# Patient Record
Sex: FEMALE | Race: WHITE | Employment: OTHER | URBAN - METROPOLITAN AREA
[De-identification: names, ages, dates, MRNs, and addresses within clinical notes are randomized per-mention and may not be internally consistent; named-entity substitution may affect disease eponyms.]

---

## 2017-02-06 ENCOUNTER — OFFICE VISIT (OUTPATIENT)
Dept: INTERNAL MEDICINE CLINIC | Age: 82
End: 2017-02-06

## 2017-02-06 ENCOUNTER — HOSPITAL ENCOUNTER (OUTPATIENT)
Dept: LAB | Age: 82
Discharge: HOME | End: 2017-02-06
Payer: MEDICARE

## 2017-02-06 VITALS
WEIGHT: 97 LBS | BODY MASS INDEX: 22.45 KG/M2 | DIASTOLIC BLOOD PRESSURE: 60 MMHG | OXYGEN SATURATION: 99 % | RESPIRATION RATE: 16 BRPM | HEIGHT: 55 IN | SYSTOLIC BLOOD PRESSURE: 140 MMHG | HEART RATE: 74 BPM

## 2017-02-06 DIAGNOSIS — M81.0 SENILE OSTEOPOROSIS: ICD-10-CM

## 2017-02-06 DIAGNOSIS — E78.5 DYSLIPIDEMIA, GOAL LDL BELOW 100: ICD-10-CM

## 2017-02-06 DIAGNOSIS — I10 ESSENTIAL HYPERTENSION: ICD-10-CM

## 2017-02-06 DIAGNOSIS — I10 HTN (HYPERTENSION), BENIGN: Primary | ICD-10-CM

## 2017-02-06 PROCEDURE — 80053 COMPREHEN METABOLIC PANEL: CPT

## 2017-02-06 PROCEDURE — 84443 ASSAY THYROID STIM HORMONE: CPT

## 2017-02-06 PROCEDURE — 80061 LIPID PANEL: CPT

## 2017-02-06 PROCEDURE — 82306 VITAMIN D 25 HYDROXY: CPT

## 2017-02-06 PROCEDURE — 85025 COMPLETE CBC W/AUTO DIFF WBC: CPT

## 2017-02-06 PROCEDURE — 36415 COLL VENOUS BLD VENIPUNCTURE: CPT

## 2017-02-06 NOTE — PROGRESS NOTES
SUBJECTIVE: Jodi Saravia is a 80 y.o. female seen for a follow up visit; she has hypertension, hyperlipidemia and no known cardiovascular conditions. Current Outpatient Prescriptions   Medication Sig Dispense Refill    amLODIPine (NORVASC) 5 mg tablet TAKE ONE (1) TABLET(S) DAILY 90 Tab 1    losartan-hydroCHLOROthiazide (HYZAAR) 100-25 mg per tablet TAKE ONE TABLET BY MOUTH ONCE DAILY 90 Tab 1    triamcinolone acetonide (KENALOG) 0.1 % ointment Apply  to affected area two (2) times a day. use thin layer 80 g 2    simvastatin (ZOCOR) 10 mg tablet TAKE ONE (1) TABLET(S) EVERY EVENING 90 Tab 3    ranitidine (ZANTAC) 150 mg tablet Take 1 Tab by mouth two (2) times a day.  BISMUTH SUBSALICYLATE (PEPTO-BISMOL PO) Take  by mouth as needed.  PREVNAR 13, PF, 0.5 mL syrg injection   0    multivitamin (ONE A DAY) tablet Take 1 Tab by mouth daily. Patient Active Problem List   Diagnosis Code    Dyslipidemia, goal LDL below 100 E78.5    Cognitive decline R41.89    Eczema L30.9    Senile osteoporosis M81.0    Encounter for long-term (current) use of other medications Z79.899    HTN (hypertension), benign I10     System Review: Cardiovascular ROS - taking medications as instructed, no medication side effects noted, home BP monitoring in range of 620'K systolic over 49'U diastolic, no TIA's, no chest pain on exertion, no dyspnea on exertion, slight swelling of ankles, CNS ROS - no TIA or stroke-like symptoms, no amaurosis, diplopia, abnormal speech, unilateral numbness or weakness. New concerns: edema a little  Better in morning  No falls or pain  Off biphosphonates  Home BP   stable. OBJECTIVE:  Visit Vitals    /60    Pulse 74    Resp 16    Ht 4' 6\" (1.372 m)    Wt 97 lb (44 kg)    LMP 02/05/1974    SpO2 99%    BMI 23.39 kg/m2      Appearance: alert, well appearing, and in no distress and normal appearing weight.   General exam: CVS exam BP noted to be well controlled today in office, S1, S2 normal, no gallop, no murmur, chest clear, no JVD, no HSM, slight edema. Lab review: orders written for new lab studies as appropriate; see orders, no lab studies available for review at time of visit. Lab Results   Component Value Date/Time    Cholesterol, total 189 02/03/2016 12:00 AM    HDL Cholesterol 71 02/03/2016 12:00 AM    LDL, calculated 64 02/03/2016 12:00 AM    VLDL, calculated 54 02/03/2016 12:00 AM    Triglyceride 269 02/03/2016 12:00 AM       ASSESSMENT:  hypertension stable, hyperlipidemia stable. Edema ask her to see  How her ankles are  After sleeping  PLAN:  orders and follow up as documented in patient record  reviewed diet, exercise and weight control  recommended sodium restriction  cardiovascular risk and specific lipid/LDL goals reviewed. Richie Walters was seen today for follow-up. Diagnoses and all orders for this visit:    HTN (hypertension), benign    Senile osteoporosis  -     VITAMIN D, 25 HYDROXY; Future    Essential hypertension  -     CBC WITH AUTOMATED DIFF  -     LIPID PANEL  -     METABOLIC PANEL, COMPREHENSIVE  -     TSH 3RD GENERATION    Dyslipidemia, goal LDL below 100  -     LIPID PANEL                      Preventing falls (The Basics)Written by the doctors and editors at UpToDate       Am I at risk of falling?  Your risk of falling increases as you grow older. Thats because getting older can make it harder to walk steadily and keep your balance. Also, the effects of falls are more serious in older people. Overall, 3 to 4 out of every 10 people over the age of 72 fall each year. Up to 76 percent of people who fracture a hip never recover to the point they were before they had their fracture. If you have fallen in the past, you are at higher risk of falling again.     Several things can increase your risk of a fall, including:  Illness   A change in the medicines you take   An unsafe or unfamiliar setting (for example, a room with rugs or furniture that might trip you, or an area you dont know well)    How can my doctor help me to avoid falling?  Your doctor can talk to you about the following things:  Past falls  It is important to tell your doctor about any times you have fallen or almost fallen. He or she can then suggest ways to prevent another fall. Your health conditions  Some health problems can put you at risk of falling. These include conditions that affect eyesight, hearing, muscle strength, or balance. The medicines you take  Certain medicines can increase the risk of falling. These include some medicines that are used for sleeping problems, anxiety, or depression. Adding new medicines, or changing doses of some medicines, can also affect your risk of falling. The more your doctor knows about your situation, the better he or she will be able to help you. For example, if you fell because you have a condition that causes pain, your doctor might suggest treatments to deal with the pain. Or if 1 of your medicines is making you dizzy and more likely to fall, your doctor might switch you to a different medicine. Is there anything I can do on my own?  Yes. To help keep from falling, you can:  Make your home safer  To avoid falling at home, get rid of things that might make you trip or slip. This might include furniture, electrical cords, clutter, and loose rugs. Keep your home well lit so that you can easily see where you are going. Avoid storing things in high places so you dont have to reach or climb. Wear sturdy shoes that fit well  Wearing shoes with high heels or slippery soles, or shoes that are too loose can lead to falls. Walking around in bare feet, or only socks, can also increase your risk of falling. Take vitamin D pills  Taking vitamin D might lower the risk of falls in older people. This is because vitamin D helps make bones and muscles stronger.  Your doctor can help you decide how much vitamin D to take.   Stay active  Exercising on a regular basis can help lower your risk of falling. It is best to do a few different activities that help with both strength and balance. There are many kinds of exercise that can be safe for older people. These include walking, swimming, and Arslan Chi (a [employer] martial art that involves slow, gentle movements). Use a cane, walker, and other safety devices  If your doctor recommends that you use a cane or walker, be sure that its the right size and you know how to use it. There are other devices that might help you avoid falling, too. These include grab bars or a sturdy seat for the shower, and hand rails or treads for the stairs (to prevent slipping). If you worry that you could fall, there are also alarm buttons that let you call for help if you fall and cant get up. What should I do if I fall?  If you fall, see your doctor right away, even if you arent hurt. Your doctor can try to figure out what caused you to fall, and how likely you are to fall again. He or she will do an exam and talk to you about your health problems, medicines, and activities. Then he or she can suggest things you can do to avoid falling again. Many older people have a hard time recovering after a fall. Doing things to prevent falling can help you to protect your health and independence. Muscle Conditioning: Exercises  Your Care Instructions  Here are some examples of exercises for muscle conditioning. Start each exercise slowly. Ease off the exercise if you start to have pain. Your doctor or physical therapist will tell you when you can start these exercises and which ones will work best for you. How to do the exercises  Wall push-ups    1. Stand facing a wall, about 12 to 18 inches away. 2. Place your hands on the wall at shoulder height. 3. Slowly bend your elbows and bring your face toward the wall, moving your hips and shoulders forward together.   4. Push slowly back to the starting position. 5. Start with 5 repetitions and work up to 8 to 12. 6. Rest for a minute, and repeat the exercise. Note: When you can do this exercise against a wall comfortably (without your muscles feeling tired), you can try it against a counter. Start with 5 repetitions again and work up to 8 to 12. You can then slowly progress to the end of a couch or a sturdy chair, and finally to the floor. Knee extension    1. While sitting in a chair, straighten one leg and hold while you slowly count to 5. Be sure you do not lock your knee. 2. Repeat 8 to 12 times. 3. Rest for a minute, and repeat the exercise. 4. Do the same exercise with the other leg. Note: If this exercise becomes easy, you can add a light weight around your ankle or tie an elastic resistance band to a chair leg and one ankle. Side-lying leg lift    1. Lie on your side, with your legs extended. Keep your hips straight up and down during this exercise. Do not let your top hip rock toward the back. Support your head with your hand, and place the other hand on the floor near your waist.  2. Slowly raise your upper leg until it is about in line with your shoulder. Keep your toes pointed forward. 3. Slowly lower your leg to the starting position. 4. Repeat 8 to 12 times. 5. Rest for a minute, and repeat the exercise. 6. Turn to your other side and do the same exercise with your other leg. Note: If this exercise becomes easy, you can add a light weight around your ankle or tie an elastic resistance band to both ankles. Shallow standing knee bends    1. Stand with your hands lightly resting on a counter or chair in front of you with your feet shoulder-width apart. 2. Slowly bend your knees so that you squat down just like you were going to sit in a chair. Make sure your knees do not go in front of your toes. 3. Lower yourself about 6 inches. Your heels should remain on the floor at all times.   4. Rise slowly to a standing position. 5. Repeat 8 to 12 times. 6. Rest for a minute, and repeat the exercise. Follow-up care is a key part of your treatment and safety. Be sure to make and go to all appointments, and call your doctor if you are having problems. It's also a good idea to know your test results and keep a list of the medicines you take.

## 2017-02-06 NOTE — PROGRESS NOTES
Reviewed record in preparation for visit and have obtained necessary documentation. Lucila Rocha is a 80 y.o. female with the following history as recorded in [facility]:  Patient Active Problem List    Diagnosis Date Noted    HTN (hypertension), benign 08/03/2016    Encounter for long-term (current) use of other medications 10/16/2013    Senile osteoporosis 10/06/2013    Eczema 09/18/2013    Dyslipidemia, goal LDL below 100 06/19/2012    Cognitive decline 06/19/2012     Current Outpatient Prescriptions   Medication Sig Dispense Refill    amLODIPine (NORVASC) 5 mg tablet TAKE ONE (1) TABLET(S) DAILY 90 Tab 1    losartan-hydroCHLOROthiazide (HYZAAR) 100-25 mg per tablet TAKE ONE TABLET BY MOUTH ONCE DAILY 90 Tab 1    triamcinolone acetonide (KENALOG) 0.1 % ointment Apply  to affected area two (2) times a day. use thin layer 80 g 2    simvastatin (ZOCOR) 10 mg tablet TAKE ONE (1) TABLET(S) EVERY EVENING 90 Tab 3    ranitidine (ZANTAC) 150 mg tablet Take 1 Tab by mouth two (2) times a day.  BISMUTH SUBSALICYLATE (PEPTO-BISMOL PO) Take  by mouth as needed.  PREVNAR 13, PF, 0.5 mL syrg injection   0    multivitamin (ONE A DAY) tablet Take 1 Tab by mouth daily. Allergies: Fosamax [alendronate]  Past Medical History   Diagnosis Date    Cognitive decline 6/19/2012    Hypertension      No past surgical history on file. No family history on file.   Social History   Substance Use Topics    Smoking status: Never Smoker    Smokeless tobacco: Never Used    Alcohol use 1.0 oz/week     2 Glasses of wine per week     No turner has edema    Vitals:    02/06/17 1516   BP: 140/60   Pulse: 74   Resp: 16   SpO2: 99%   Weight: 97 lb (44 kg)   Height: 4' 6\" (1.372 m)

## 2017-02-06 NOTE — MR AVS SNAPSHOT
Visit Information Date & Time Provider Department Dept. Phone Encounter #  
 2/6/2017  3:30 PM Pooja Schumacher, [facility] [phone] [id number] Your Appointments 8/8/2017  2:00 PM  
ROUTINE CARE with Pooja Schumacher MD  
[facility]) [place] [address] [place] [address] [facility] [address] [place] [address] [address] [facility] [place] [facility] [address] Upcoming Health Maintenance Date Due ZOSTER VACCINE AGE 60> 3/15/1981 Pneumococcal 65+ Low/Medium Risk (2 of 2 - PCV13) 9/18/2014 GLAUCOMA SCREENING Q2Y 3/1/2016 MEDICARE YEARLY EXAM 8/4/2017 DTaP/Tdap/Td series (2 - Td) 6/7/2022 Allergies as of 2/6/2017  Review Complete On: 2/6/2017 By: Ángel Salomon LPN Severity Noted Reaction Type Reactions Fosamax [Alendronate]  02/05/2014   Side Effect Myalgia Current Immunizations  Reviewed on 10/4/2016 Name Date Influenza High Dose Vaccine PF 10/4/2016, 10/13/2015 Influenza Vaccine 9/18/2013 Pneumococcal Polysaccharide (PPSV-23) 9/18/2013 TDAP Vaccine 6/7/2012 Not reviewed this visit You Were Diagnosed With   
  
 Codes Comments HTN (hypertension), benign    -  Primary ICD-10-CM: I10 
ICD-9-CM: 401.1 Senile osteoporosis     ICD-10-CM: M81.0 ICD-9-CM: 733.01 Essential hypertension     ICD-10-CM: I10 
ICD-9-CM: 401.9 Dyslipidemia, goal LDL below 100     ICD-10-CM: E78.5 ICD-9-CM: 272.4 Vitals BP Pulse Resp Height(growth percentile) Weight(growth percentile) LMP  
 140/60 74 16 4' 6\" (1.372 m) 97 lb (44 kg) 02/05/1974 SpO2 BMI OB Status Smoking Status 99% 23.39 kg/m2 Postmenopausal Never Smoker Vitals History BMI and BSA Data Body Mass Index Body Surface Area  
 23.39 kg/m 2 1.29 m 2 Preferred Pharmacy Pharmacy Name Phone [id number] [address] [phone] Your Updated Medication List  
  
   
This list is accurate as of: 2/6/17  3:46 PM.  Always use your most recent med list. amLODIPine 5 mg tablet Commonly known as:  Mane Presser TAKE ONE (1) TABLET(S) DAILY losartan-hydroCHLOROthiazide 100-25 mg per tablet Commonly known as:  HYZAAR  
TAKE ONE TABLET BY MOUTH ONCE DAILY  
  
 multivitamin tablet Commonly known as:  ONE A DAY Take 1 Tab by mouth daily. PEPTO-BISMOL PO Take  by mouth as needed. PREVNAR 13 (PF) 0.5 mL Syrg injection Generic drug:  pneumococcal 13 katey conj dip  
  
 raNITIdine 150 mg tablet Commonly known as:  ZANTAC Take 1 Tab by mouth two (2) times a day. simvastatin 10 mg tablet Commonly known as:  ZOCOR  
TAKE ONE (1) TABLET(S) EVERY EVENING  
  
 triamcinolone acetonide 0.1 % ointment Commonly known as:  KENALOG Apply  to affected area two (2) times a day. use thin layer We Performed the Following CBC WITH AUTOMATED DIFF [78372 CPT(R)] LIPID PANEL [79937 CPT(R)] METABOLIC PANEL, COMPREHENSIVE [40806 CPT(R)] TSH 3RD GENERATION [16225 CPT(R)] To-Do List   
 02/06/2017 Lab:  VITAMIN D, 25 HYDROXY Introducing [facility]! Dear Samantha Carrera: Thank you for requesting a amSTATZ account. Our records indicate that you already have an active amSTATZ account. You can access your account anytime at [email or web address] Did you know that you can access your hospital and ER discharge instructions at any time in amSTATZ? You can also review all of your test results from your hospital stay or ER visit. Additional Information If you have questions, please visit the Frequently Asked Questions section of the amSTATZ website at [email or web address] Remember, MyChart is NOT to be used for urgent needs. For medical emergencies, dial 911. Now available from your iPhone and Android! Please provide this summary of care documentation to your next provider. Your primary care clinician is listed as Brower Loser. If you have any questions after today's visit, please call [phone].

## 2017-02-06 NOTE — LETTER
2/10/2017 3:23 PM 
 
Ms. [address] [place] [address] Dear Raudel Patel: 
 
Please find your most recent results below. Resulted Orders CBC WITH AUTOMATED DIFF Result Value Ref Range WBC 5.8 3.4 - 10.8 x10E3/uL  
 RBC 3.96 3.77 - 5.28 x10E6/uL HGB 11.3 11.1 - 15.9 g/dL HCT 34.7 34.0 - 46.6 % MCV 88 79 - 97 fL  
 MCH 28.5 26.6 - 33.0 pg  
 MCHC 32.6 31.5 - 35.7 g/dL  
 RDW 14.4 12.3 - 15.4 % PLATELET 143 160 - 695 x10E3/uL NEUTROPHILS 73 % Lymphocytes 13 % MONOCYTES 11 % EOSINOPHILS 2 % BASOPHILS 1 %  
 ABS. NEUTROPHILS 4.3 1.4 - 7.0 x10E3/uL Abs Lymphocytes 0.7 0.7 - 3.1 x10E3/uL  
 ABS. MONOCYTES 0.6 0.1 - 0.9 x10E3/uL  
 ABS. EOSINOPHILS 0.1 0.0 - 0.4 x10E3/uL  
 ABS. BASOPHILS 0.0 0.0 - 0.2 x10E3/uL IMMATURE GRANULOCYTES 0 %  
 ABS. IMM. GRANS. 0.0 0.0 - 0.1 x10E3/uL Narrative Performed at:  [address]  [id number] : Philomena Blackburn MD, Phone:  [phone] LIPID PANEL Result Value Ref Range Cholesterol, total 169 100 - 199 mg/dL Triglyceride 272 (H) 0 - 149 mg/dL HDL Cholesterol 64 >39 mg/dL VLDL, calculated 54 (H) 5 - 40 mg/dL LDL, calculated 51 0 - 99 mg/dL Narrative Performed at:  [address]  [id number] : Philomena Blackburn MD, Phone:  [phone] METABOLIC PANEL, COMPREHENSIVE Result Value Ref Range Glucose 107 (H) 65 - 99 mg/dL BUN 27 10 - 36 mg/dL Creatinine 1.28 (H) 0.57 - 1.00 mg/dL GFR est non-AA 36 (L) >59 mL/min/1.73 GFR est AA 41 (L) >59 mL/min/1.73  
 BUN/Creatinine ratio 21 11 - 26 Sodium 144 134 - 144 mmol/L Potassium 4.9 3.5 - 5.2 mmol/L Chloride 103 96 - 106 mmol/L  
 CO2 25 18 - 29 mmol/L Calcium 9.2 8.7 - 10.3 mg/dL Protein, total 6.3 6.0 - 8.5 g/dL Albumin 4.1 3.2 - 4.6 g/dL GLOBULIN, TOTAL 2.2 1.5 - 4.5 g/dL A-G Ratio 1.9 1.1 - 2.5 Bilirubin, total <0.2 0.0 - 1.2 mg/dL Alk. phosphatase 69 39 - 117 IU/L  
 AST (SGOT) 22 0 - 40 IU/L  
 ALT (SGPT) 11 0 - 32 IU/L Narrative Performed at:  [address]  [id number] : Miah Carranza MD, Phone:  [phone] TSH 3RD GENERATION Result Value Ref Range TSH 3.130 0.450 - 4.500 uIU/mL Narrative Performed at:  [address]  [id number] : Miah Carranza MD, Phone:  [phone] CVD REPORT Result Value Ref Range INTERPRETATION NTAP   
 PDF IMAGE Not applicable Narrative Performed at:  [address] [place] [address]  [id number] : Osorio Shin PhD, Phone:  [phone] CKD REPORT Result Value Ref Range Interpretation Note Comment:  
   Supplement report is available. Narrative Performed at:  [address] [place] [address]  [id number] : Osorio Shin PhD, Phone:  [phone] VITAMIN D, 25 HYDROXY Result Value Ref Range VITAMIN D, 25-HYDROXY 33.1 30.0 - 100.0 ng/mL Comment:  
   Vitamin D deficiency has been defined by the [address] [place] [address] practice guideline as a 
level of serum 25-OH vitamin D less than 20 ng/mL (1,2). The Endocrine Society went on to further define vitamin D 
insufficiency as a level between 21 and 29 ng/mL (2). 1. IOM ([place] of [employer]). 2010. Dietary reference 
   intakes for calcium and D. [facility]: The 
   ARCsys. 2. Alia MF, Anny [place], Xander YI, et al. 
   Evaluation, treatment, and prevention of vitamin D 
   deficiency: an Endocrine Society clinical practice 
   guideline. JCEM. 2011 Jul; 96(7):1911-30. Narrative Performed at:  [address]  [id number] : More Arita MD, Phone:  [phone] RECOMMENDATIONS: 
I have reviewed all lab results which are normal or stable Please call me if you have any questions: [phone] Sincerely, Neo Fraser MD

## 2017-02-07 LAB
25(OH)D3+25(OH)D2 SERPL-MCNC: 33.1 NG/ML (ref 30–100)
ALBUMIN SERPL-MCNC: 4.1 G/DL (ref 3.2–4.6)
ALBUMIN/GLOB SERPL: 1.9 {RATIO} (ref 1.1–2.5)
ALP SERPL-CCNC: 69 IU/L (ref 39–117)
ALT SERPL-CCNC: 11 IU/L (ref 0–32)
AST SERPL-CCNC: 22 IU/L (ref 0–40)
BASOPHILS # BLD AUTO: 0 X10E3/UL (ref 0–0.2)
BASOPHILS NFR BLD AUTO: 1 %
BILIRUB SERPL-MCNC: <0.2 MG/DL (ref 0–1.2)
BUN SERPL-MCNC: 27 MG/DL (ref 10–36)
BUN/CREAT SERPL: 21 (ref 11–26)
CALCIUM SERPL-MCNC: 9.2 MG/DL (ref 8.7–10.3)
CHLORIDE SERPL-SCNC: 103 MMOL/L (ref 96–106)
CHOLEST SERPL-MCNC: 169 MG/DL (ref 100–199)
CO2 SERPL-SCNC: 25 MMOL/L (ref 18–29)
CREAT SERPL-MCNC: 1.28 MG/DL (ref 0.57–1)
EOSINOPHIL # BLD AUTO: 0.1 X10E3/UL (ref 0–0.4)
EOSINOPHIL NFR BLD AUTO: 2 %
ERYTHROCYTE [DISTWIDTH] IN BLOOD BY AUTOMATED COUNT: 14.4 % (ref 12.3–15.4)
GLOBULIN SER CALC-MCNC: 2.2 G/DL (ref 1.5–4.5)
GLUCOSE SERPL-MCNC: 107 MG/DL (ref 65–99)
HCT VFR BLD AUTO: 34.7 % (ref 34–46.6)
HDLC SERPL-MCNC: 64 MG/DL
HGB BLD-MCNC: 11.3 G/DL (ref 11.1–15.9)
IMM GRANULOCYTES # BLD: 0 X10E3/UL (ref 0–0.1)
IMM GRANULOCYTES NFR BLD: 0 %
INTERPRETATION, 910389: NORMAL
INTERPRETATION: NORMAL
LDLC SERPL CALC-MCNC: 51 MG/DL (ref 0–99)
LYMPHOCYTES # BLD AUTO: 0.7 X10E3/UL (ref 0.7–3.1)
LYMPHOCYTES NFR BLD AUTO: 13 %
MCH RBC QN AUTO: 28.5 PG (ref 26.6–33)
MCHC RBC AUTO-ENTMCNC: 32.6 G/DL (ref 31.5–35.7)
MCV RBC AUTO: 88 FL (ref 79–97)
MONOCYTES # BLD AUTO: 0.6 X10E3/UL (ref 0.1–0.9)
MONOCYTES NFR BLD AUTO: 11 %
NEUTROPHILS # BLD AUTO: 4.3 X10E3/UL (ref 1.4–7)
NEUTROPHILS NFR BLD AUTO: 73 %
PDF IMAGE, 910387: NORMAL
PLATELET # BLD AUTO: 240 X10E3/UL (ref 150–379)
POTASSIUM SERPL-SCNC: 4.9 MMOL/L (ref 3.5–5.2)
PROT SERPL-MCNC: 6.3 G/DL (ref 6–8.5)
RBC # BLD AUTO: 3.96 X10E6/UL (ref 3.77–5.28)
SODIUM SERPL-SCNC: 144 MMOL/L (ref 134–144)
TRIGL SERPL-MCNC: 272 MG/DL (ref 0–149)
TSH SERPL DL<=0.005 MIU/L-ACNC: 3.13 UIU/ML (ref 0.45–4.5)
VLDLC SERPL CALC-MCNC: 54 MG/DL (ref 5–40)
WBC # BLD AUTO: 5.8 X10E3/UL (ref 3.4–10.8)

## 2017-02-09 ENCOUNTER — TELEPHONE (OUTPATIENT)
Dept: INTERNAL MEDICINE CLINIC | Age: 82
End: 2017-02-09

## 2017-02-09 NOTE — TELEPHONE ENCOUNTER
Radha Lopes notified the patient that the test results have not been reviewed by Dr Jamee Zheng yet.

## 2017-02-13 ENCOUNTER — TELEPHONE (OUTPATIENT)
Dept: INTERNAL MEDICINE CLINIC | Age: 82
End: 2017-02-13

## 2017-02-13 NOTE — TELEPHONE ENCOUNTER
Left detailed message for the patient that a letter was mailed to the address on file. Dr Jamee Zheng states that the labs are normal and stable. Asked for a return call to the office with any further questions.

## 2017-02-13 NOTE — TELEPHONE ENCOUNTER
Pt calling about lab results. Pt is very upset said that she hasn't heard anything from the dr. Lord Cuenca nurse about her labs .

## 2017-02-14 NOTE — TELEPHONE ENCOUNTER
Writer spoke with patient and states that there was a letter sent stating that her labs were normal/stable.  Patient is pleased

## 2017-05-02 ENCOUNTER — OFFICE VISIT (OUTPATIENT)
Dept: INTERNAL MEDICINE CLINIC | Age: 82
End: 2017-05-02

## 2017-05-02 VITALS
OXYGEN SATURATION: 98 % | BODY MASS INDEX: 22.21 KG/M2 | RESPIRATION RATE: 16 BRPM | HEART RATE: 62 BPM | WEIGHT: 96 LBS | SYSTOLIC BLOOD PRESSURE: 154 MMHG | DIASTOLIC BLOOD PRESSURE: 72 MMHG | HEIGHT: 55 IN

## 2017-05-02 DIAGNOSIS — I10 ESSENTIAL HYPERTENSION: ICD-10-CM

## 2017-05-02 DIAGNOSIS — M54.40 MIDLINE LOW BACK PAIN WITH SCIATICA, SCIATICA LATERALITY UNSPECIFIED, UNSPECIFIED CHRONICITY: Primary | ICD-10-CM

## 2017-05-02 NOTE — PROGRESS NOTES
Chief Complaint   Patient presents with    Back Pain     lower back pain; x 1 week taking, patient bend down the wrong way    Hypertension     high Bp readings     In pain for a week since bending to  an object on floor  5 days ago      BP  fluctiuating    Patient Active Problem List    Diagnosis    HTN (hypertension), benign    Encounter for long-term (current) use of other medications    Senile osteoporosis     Intolerant to fosamax  Not in favor of insion or injection  Refused reclast      Eczema    Dyslipidemia, goal LDL below 100    Cognitive decline         Vitals:    05/02/17 1441   BP: 154/72   Pulse: 62   Resp: 16   SpO2: 98%   Weight: 96 lb (43.5 kg)   Height: 4' 6\" (1.372 m)     S1 and S2 normal, no murmurs, clicks, gallops or rubs. Regular rate and rhythm. Chest is clear; no wheezes or rales. No edema or JVD. Back exam: limited range of motion, pain with motion noted during exam.             Ayla Bernal was seen today for back pain and hypertension. Diagnoses and all orders for this visit:    Midline low back pain with sciatica, sciatica laterality unspecified, unspecified chronicity    Essential hypertension      Use Tylenol for pain max of 3000 mg daily  . reassure  Monitor BP high with pain

## 2017-06-22 DIAGNOSIS — E78.5 DYSLIPIDEMIA, GOAL LDL BELOW 100: ICD-10-CM

## 2017-06-22 RX ORDER — LOSARTAN POTASSIUM AND HYDROCHLOROTHIAZIDE 25; 100 MG/1; MG/1
TABLET ORAL
Qty: 90 TAB | Refills: 1 | Status: SHIPPED | OUTPATIENT
Start: 2017-06-22 | End: 2017-12-14 | Stop reason: SDUPTHER

## 2017-06-22 RX ORDER — AMLODIPINE BESYLATE 5 MG/1
TABLET ORAL
Qty: 90 TAB | Refills: 1 | Status: SHIPPED | OUTPATIENT
Start: 2017-06-22 | End: 2017-12-14 | Stop reason: SDUPTHER

## 2017-07-05 ENCOUNTER — OFFICE VISIT (OUTPATIENT)
Dept: INTERNAL MEDICINE CLINIC | Age: 82
End: 2017-07-05

## 2017-07-05 VITALS
OXYGEN SATURATION: 98 % | HEIGHT: 55 IN | DIASTOLIC BLOOD PRESSURE: 71 MMHG | RESPIRATION RATE: 14 BRPM | WEIGHT: 93 LBS | SYSTOLIC BLOOD PRESSURE: 153 MMHG | BODY MASS INDEX: 21.52 KG/M2 | TEMPERATURE: 98.4 F | HEART RATE: 79 BPM

## 2017-07-05 DIAGNOSIS — H61.21 IMPACTED CERUMEN OF RIGHT EAR: Primary | ICD-10-CM

## 2017-07-05 NOTE — PROGRESS NOTES
Chief Complaint   Patient presents with    Wax in Ear    Hypertension   recent family stress death of niece  She is worried about this BP  Stable mostly    Cardiovascular ROS: no chest pain or dyspnea on exertion  Neurological ROS: no TIA or stroke symptoms  General ROS: negative for - chills, fatigue, fever, malaise, night sweats or sleep disturbance  Endocrine ROS: negative for - hair pattern changes, hot flashes, malaise/lethargy, palpitations, polydipsia/polyuria, temperature intolerance or unexpected weight changes'  Vitals:    07/05/17 1541   BP: 153/71   Pulse: 79   Resp: 14   Temp: 98.4 °F (36.9 °C)   TempSrc: Oral   SpO2: 98%   Weight: 93 lb (42.2 kg)   Height: 4' 6\" (1.372 m)       Subjective:     Jackson Mcneil is a 80 y.o. female who presents for evaluation of a plugged ear. She has noticed right symptoms 3 week ago. There is a prior history of cerumen impaction. Patient denies ear pain. Patient has hearing loss. Objective:     Vitals:    07/05/17 1541   BP: 153/71   Pulse: 79   Resp: 14   Temp: 98.4 °F (36.9 °C)   TempSrc: Oral   SpO2: 98%   Weight: 93 lb (42.2 kg)   Height: 4' 6\" (1.372 m)       Visit Vitals    /71 (BP 1 Location: Left arm, BP Patient Position: Sitting)    Pulse 79    Temp 98.4 °F (36.9 °C) (Oral)    Resp 14    Ht 4' 6\" (1.372 m)    Wt 93 lb (42.2 kg)    LMP 02/05/1974    SpO2 98%    BMI 22.42 kg/m2       General: alert, cooperative, no distress   Right Ear: ceruminosis noted, cerumen removed. Left Ear:  left ear normal.    After removal: bilateral TM's and external ear canals normal, cerumen removed. Oropharynx:   normal.   Neck:  supple, symmetrical, trachea midline and no adenopathy. Assessment/Plan:     Cerumen Impaction, without otitis externa. 1. Cerumen removed by flushing, currettage. 2. Care instructions given. 3. Home treatment: none  4. Followup as needed. Ana Guo was seen today for wax in ear and hypertension.     Diagnoses and all orders for this visit:    Impacted cerumen of right ear  -     REMOVE IMPACTED EAR WAX    .

## 2017-07-05 NOTE — MR AVS SNAPSHOT
Visit Information Date & Time Provider Department Dept. Phone Encounter #  
 7/5/2017  3:30 PM Yaa Joyce, 819 [facility] [phone] [id number] Your Appointments 10/9/2017  2:00 PM  
ROUTINE CARE with Yaa Joyce MD  
[facility] [address]) Appt Note: 3 MONTH F/U  
 [place] [address] Critical access hospital [id number]  
[facility] [address] [facility] 121 [facility] 7 [address] Upcoming Health Maintenance Date Due ZOSTER VACCINE AGE 60> 3/15/1981 Pneumococcal 65+ Low/Medium Risk (2 of 2 - PCV13) 9/18/2014 GLAUCOMA SCREENING Q2Y 3/1/2016 INFLUENZA AGE 9 TO ADULT 8/1/2017 MEDICARE YEARLY EXAM 8/4/2017 DTaP/Tdap/Td series (2 - Td) 6/7/2022 Allergies as of 7/5/2017  Review Complete On: 7/5/2017 By: Ruby Jenkins LPN Severity Noted Reaction Type Reactions Fosamax [Alendronate]  02/05/2014   Side Effect Myalgia Current Immunizations  Reviewed on 10/4/2016 Name Date Influenza High Dose Vaccine PF 10/4/2016, 10/13/2015 Influenza Vaccine 9/18/2013 Pneumococcal Polysaccharide (PPSV-23) 9/18/2013 TDAP Vaccine 6/7/2012 Not reviewed this visit You Were Diagnosed With   
  
 Codes Comments Impacted cerumen of right ear    -  Primary ICD-10-CM: H61.21 ICD-9-CM: 380.4 Vitals BP Pulse Temp Resp Height(growth percentile) Weight(growth percentile) 153/71 (BP 1 Location: Left arm, BP Patient Position: Sitting) 79 98.4 °F (36.9 °C) (Oral) 14 4' 6\" (1.372 m) 93 lb (42.2 kg) LMP SpO2 BMI OB Status Smoking Status 02/05/1974 98% 22.42 kg/m2 Postmenopausal Never Smoker Vitals History BMI and BSA Data Body Mass Index Body Surface Area  
 22.42 kg/m 2 1.27 m 2 Preferred Pharmacy Pharmacy Name Phone [id number] [address], [address] [place] [address] Ave [phone] Your Updated Medication List  
  
   
This list is accurate as of: 7/5/17  4:14 PM.  Always use your most recent med list. amLODIPine 5 mg tablet Commonly known as:  Ebenezer Lute TAKE ONE (1) TABLET(S) DAILY losartan-hydroCHLOROthiazide 100-25 mg per tablet Commonly known as:  HYZAAR  
TAKE ONE TABLET BY MOUTH ONCE DAILY  
  
 multivitamin tablet Commonly known as:  ONE A DAY Take 1 Tab by mouth daily. PEPTO-BISMOL PO Take  by mouth as needed. PREVNAR 13 (PF) 0.5 mL Syrg injection Generic drug:  pneumococcal 13 katey conj dip  
  
 raNITIdine 150 mg tablet Commonly known as:  ZANTAC Take 1 Tab by mouth two (2) times a day. simvastatin 10 mg tablet Commonly known as:  ZOCOR  
TAKE ONE (1) TABLET(S) EVERY EVENING  
  
 triamcinolone acetonide 0.1 % ointment Commonly known as:  KENALOG Apply  to affected area two (2) times a day. use thin layer We Performed the Following ID REMOVE IMPACTED EAR WAX [58128 CPT(R)] Introducing [facility] SERVICES! Dear Veena Powell: Thank you for requesting a [employer] account. Our records indicate that you already have an active [employer] account. You can access your account anytime at [email or web address] d/b/a [email or web address]. [email or web address] [employer] Did you know that you can access your hospital and ER discharge instructions at any time in [employer]? You can also review all of your test results from your hospital stay or ER visit. Additional Information If you have questions, please visit the Frequently Asked Questions section of the [employer] website at [email or web address] d/b/a [email or web address] Remember, [employer] is NOT to be used for urgent needs. For medical emergencies, dial 911. Now available from your iPhone and Android! Please provide this summary of care documentation to your next provider. Your primary care clinician is listed as Doreatha Galeazzi.  If you have any questions after today's visit, please call [phone].

## 2017-09-19 RX ORDER — SIMVASTATIN 10 MG/1
TABLET, FILM COATED ORAL
Qty: 90 TAB | Refills: 3 | Status: SHIPPED | OUTPATIENT
Start: 2017-09-19 | End: 2018-02-13 | Stop reason: ALTCHOICE

## 2017-10-09 ENCOUNTER — OFFICE VISIT (OUTPATIENT)
Dept: INTERNAL MEDICINE CLINIC | Age: 82
End: 2017-10-09

## 2017-10-09 VITALS
OXYGEN SATURATION: 98 % | WEIGHT: 93.8 LBS | SYSTOLIC BLOOD PRESSURE: 143 MMHG | DIASTOLIC BLOOD PRESSURE: 57 MMHG | BODY MASS INDEX: 21.71 KG/M2 | HEART RATE: 67 BPM | TEMPERATURE: 97.7 F | RESPIRATION RATE: 14 BRPM | HEIGHT: 55 IN

## 2017-10-09 DIAGNOSIS — Z23 ENCOUNTER FOR IMMUNIZATION: ICD-10-CM

## 2017-10-09 DIAGNOSIS — I10 HTN (HYPERTENSION), BENIGN: Primary | ICD-10-CM

## 2017-10-09 NOTE — MR AVS SNAPSHOT
Visit Information Date & Time Provider Department Dept. Phone Encounter #  
 10/9/2017  2:00 PM Maude Moseley, 819 [facility] [phone] [id number] Upcoming Health Maintenance Date Due ZOSTER VACCINE AGE 60> 1/15/1981 Pneumococcal 65+ Low/Medium Risk (2 of 2 - PCV13) 9/18/2014 GLAUCOMA SCREENING Q2Y 3/1/2016 INFLUENZA AGE 9 TO ADULT 8/1/2017 MEDICARE YEARLY EXAM 8/4/2017 DTaP/Tdap/Td series (2 - Td) 6/7/2022 Allergies as of 10/9/2017  Review Complete On: 10/9/2017 By: Jenn Hardwick Severity Noted Reaction Type Reactions Fosamax [Alendronate]  02/05/2014   Side Effect Myalgia Current Immunizations  Reviewed on 10/4/2016 Name Date Influenza High Dose Vaccine PF  Incomplete, 10/4/2016, 10/13/2015 Influenza Vaccine 9/18/2013 Pneumococcal Polysaccharide (PPSV-23) 9/18/2013 TDAP Vaccine 6/7/2012 Not reviewed this visit You Were Diagnosed With   
  
 Codes Comments Encounter for immunization    -  Primary ICD-10-CM: M02 ICD-9-CM: V03.89 Vitals BP Pulse Temp Resp Height(growth percentile) Weight(growth percentile) 143/57 (BP 1 Location: Left arm, BP Patient Position: Sitting) 67 97.7 °F (36.5 °C) (Oral) 14 4' 6\" (1.372 m) 93 lb 12.8 oz (42.5 kg) LMP SpO2 BMI OB Status Smoking Status 02/05/1974 98% 22.62 kg/m2 Postmenopausal Never Smoker Vitals History BMI and BSA Data Body Mass Index Body Surface Area  
 22.62 kg/m 2 1.27 m 2 Preferred Pharmacy Pharmacy Name Phone [id number] [place], [address] [phone] Your Updated Medication List  
  
   
This list is accurate as of: 10/9/17  2:46 PM.  Always use your most recent med list. amLODIPine 5 mg tablet Commonly known as:  Antonette Spruce TAKE ONE (1) TABLET(S) DAILY losartan-hydroCHLOROthiazide 100-25 mg per tablet Commonly known as:  HYZAAR  
TAKE ONE TABLET BY MOUTH ONCE DAILY  
  
 multivitamin tablet Commonly known as:  ONE A DAY Take 1 Tab by mouth daily. PEPTO-BISMOL PO Take  by mouth as needed. PREVNAR 13 (PF) 0.5 mL Syrg injection Generic drug:  pneumococcal 13 katey conj dip  
  
 raNITIdine 150 mg tablet Commonly known as:  ZANTAC Take 1 Tab by mouth two (2) times a day. simvastatin 10 mg tablet Commonly known as:  ZOCOR  
TAKE ONE (1) TABLET(S) EVERY EVENING  
  
 triamcinolone acetonide 0.1 % ointment Commonly known as:  KENALOG Apply  to affected area two (2) times a day. use thin layer We Performed the Following INFLUENZA VIRUS VACCINE, HIGH DOSE SEASONAL, PRESERVATIVE FREE [44204 CPT(R)] Introducing [facility] SERVICES! Dear Bhavani Murillo: Thank you for requesting a [employer] account. Our records indicate that you already have an active [employer] account. You can access your account anytime at [email or web address] [email or web address]/[employer] Did you know that you can access your hospital and ER discharge instructions at any time in [employer]? You can also review all of your test results from your hospital stay or ER visit. Additional Information If you have questions, please visit the Frequently Asked Questions section of the [employer] website at [email or web address] Remember, [employer] is NOT to be used for urgent needs. For medical emergencies, dial 911. Now available from your iPhone and Android! Please provide this summary of care documentation to your next provider. Your primary care clinician is listed as Olivier Quintero. If you have any questions after today's visit, please call [phone].

## 2017-10-09 NOTE — PROGRESS NOTES
Chief Complaint   Patient presents with    Hypertension     f/u      Chief Complaint   Patient presents with    Hypertension     f/u            BP  fluctiuating  Worse when stressed     160`s at clinic at 11 AM    Patient Active Problem List    Diagnosis    HTN (hypertension), benign    Encounter for long-term (current) use of other medications    Senile osteoporosis     Intolerant to fosamax  Not in favor of insion or injection  Refused reclast      Eczema    Dyslipidemia, goal LDL below 100    Cognitive decline     Vitals:    10/09/17 1414   BP: 143/57   Pulse: 67   Resp: 14   Temp: 97.7 °F (36.5 °C)   TempSrc: Oral   SpO2: 98%   Weight: 93 lb 12.8 oz (42.5 kg)   Height: 4' 6\" (1.372 m)         Vitals:    10/09/17 1414   BP: 143/57   Pulse: 67   Resp: 14   Temp: 97.7 °F (36.5 °C)   TempSrc: Oral   SpO2: 98%   Weight: 93 lb 12.8 oz (42.5 kg)   Height: 4' 6\" (1.372 m)     S1 and S2 normal, no murmurs, clicks, gallops or rubs. Regular rate and rhythm. Chest is clear; no wheezes or rales. No edema or JVD. Back exam: limited range of motion, pain with motion noted during exam.             Use Tylenol for pain max of 3000 mg daily  . reassure  Monitor BP high with pain    Stable status on multiple high risk medications for multiple comorbidities, will not change any of the present treatment plans    1. Encounter for immunization    - Influenza virus vaccine ([place]) 72 years and older (48728)    2.  HTN (hypertension), benign  Continue present meds     Alternate time of home BP readings

## 2017-12-14 DIAGNOSIS — E78.5 DYSLIPIDEMIA, GOAL LDL BELOW 100: ICD-10-CM

## 2017-12-14 RX ORDER — AMLODIPINE BESYLATE 5 MG/1
TABLET ORAL
Qty: 90 TAB | Refills: 1 | Status: SHIPPED | OUTPATIENT
Start: 2017-12-14 | End: 2018-06-17 | Stop reason: SDUPTHER

## 2017-12-14 RX ORDER — LOSARTAN POTASSIUM AND HYDROCHLOROTHIAZIDE 25; 100 MG/1; MG/1
TABLET ORAL
Qty: 90 TAB | Refills: 1 | Status: SHIPPED | OUTPATIENT
Start: 2017-12-14 | End: 2018-06-17 | Stop reason: SDUPTHER

## 2018-02-13 ENCOUNTER — OFFICE VISIT (OUTPATIENT)
Dept: INTERNAL MEDICINE CLINIC | Age: 83
End: 2018-02-13

## 2018-02-13 VITALS
RESPIRATION RATE: 14 BRPM | WEIGHT: 91 LBS | HEIGHT: 55 IN | BODY MASS INDEX: 21.06 KG/M2 | HEART RATE: 68 BPM | DIASTOLIC BLOOD PRESSURE: 56 MMHG | SYSTOLIC BLOOD PRESSURE: 133 MMHG | TEMPERATURE: 97.8 F | OXYGEN SATURATION: 98 %

## 2018-02-13 DIAGNOSIS — G47.00 INSOMNIA, UNSPECIFIED TYPE: ICD-10-CM

## 2018-02-13 DIAGNOSIS — Z00.00 MEDICARE ANNUAL WELLNESS VISIT, SUBSEQUENT: Primary | ICD-10-CM

## 2018-02-13 DIAGNOSIS — I10 HTN (HYPERTENSION), BENIGN: ICD-10-CM

## 2018-02-13 NOTE — PATIENT INSTRUCTIONS

## 2018-02-13 NOTE — PROGRESS NOTES
Chief Complaint   Patient presents with    Hypertension    Sleep Problem     Sleep habits poor  Poor appetite    This is a Subsequent Medicare Annual Wellness Exam (AWV) (Performed 12 months after IPPE or effective date of Medicare Part B enrollment)    I have reviewed the patient's medical history in detail and updated the computerized patient record. History     Past Medical History:   Diagnosis Date    Cognitive decline 6/19/2012    Hypertension       History reviewed. No pertinent surgical history. Current Outpatient Prescriptions   Medication Sig Dispense Refill    amLODIPine (NORVASC) 5 mg tablet TAKE ONE (1) TABLET(S) DAILY 90 Tab 1    losartan-hydroCHLOROthiazide (HYZAAR) 100-25 mg per tablet TAKE ONE TABLET BY MOUTH ONCE DAILY 90 Tab 1    simvastatin (ZOCOR) 10 mg tablet TAKE ONE (1) TABLET(S) EVERY EVENING 90 Tab 3    BISMUTH SUBSALICYLATE (PEPTO-BISMOL PO) Take  by mouth as needed.  multivitamin (ONE A DAY) tablet Take 1 Tab by mouth daily.  triamcinolone acetonide (KENALOG) 0.1 % ointment Apply  to affected area two (2) times a day. use thin layer 80 g 2    ranitidine (ZANTAC) 150 mg tablet Take 1 Tab by mouth two (2) times a day.  PREVNAR 13, PF, 0.5 mL syrg injection   0     Allergies   Allergen Reactions    Fosamax [Alendronate] Myalgia     History reviewed. No pertinent family history.   Social History   Substance Use Topics    Smoking status: Never Smoker    Smokeless tobacco: Never Used    Alcohol use 1.0 oz/week     2 Glasses of wine per week     Patient Active Problem List   Diagnosis Code    Dyslipidemia, goal LDL below 100 E78.5    Cognitive decline R41.89    Eczema L30.9    Senile osteoporosis M81.0    Encounter for long-term (current) use of other medications Z79.899    HTN (hypertension), benign I10       Depression Risk Factor Screening:     PHQ over the last two weeks 5/2/2017   Little interest or pleasure in doing things Not at all   Feeling down, depressed or hopeless Not at all   Total Score PHQ 2 0     Alcohol Risk Factor Screening: You do not drink alcohol or very rarely. On any occasion in the past three months you have had more than 3 drinks containing alcohol. Functional Ability and Level of Safety:   Hearing Loss  The patient wears hearing aids. Activities of Daily Living  The home contains: handrails  Patient does total self care    Fall Risk  Fall Risk Assessment, last 12 mths 5/2/2017   Able to walk? Yes   Fall in past 12 months? No       Abuse Screen  Patient is not abused    Cognitive Screening   Evaluation of Cognitive Function:  Has your family/caregiver stated any concerns about your memory: yes  Abnormal    Patient Care Team   Patient Care Team:  Yadi Silver MD as PCP - General (Internal Medicine)    Assessment/Plan   Education and counseling provided:  Are appropriate based on today's review and evaluation  End-of-Life planning (with patient's consent)    Diagnoses and all orders for this visit:    1. Medicare annual wellness visit, subsequent      Health Maintenance Due   Topic Date Due    ZOSTER VACCINE AGE 60>  01/15/1981    Pneumococcal 65+ Low/Medium Risk (2 of 2 - PCV13) 09/18/2014    GLAUCOMA SCREENING Q2Y  03/01/2016    MEDICARE YEARLY EXAM  08/04/2017     SUBJECTIVE: Silvia Gordon is a 80 y.o. female seen for a follow up visit; she has hypertension and hyperlipidemia. Current Outpatient Prescriptions   Medication Sig Dispense Refill    amLODIPine (NORVASC) 5 mg tablet TAKE ONE (1) TABLET(S) DAILY 90 Tab 1    losartan-hydroCHLOROthiazide (HYZAAR) 100-25 mg per tablet TAKE ONE TABLET BY MOUTH ONCE DAILY 90 Tab 1    BISMUTH SUBSALICYLATE (PEPTO-BISMOL PO) Take  by mouth as needed.  multivitamin (ONE A DAY) tablet Take 1 Tab by mouth daily.  triamcinolone acetonide (KENALOG) 0.1 % ointment Apply  to affected area two (2) times a day.  use thin layer 80 g 2    ranitidine (ZANTAC) 150 mg tablet Take 1 Tab by mouth two (2) times a day. Patient Active Problem List   Diagnosis Code    Dyslipidemia, goal LDL below 100 E78.5    Cognitive decline R41.89    Eczema L30.9    Senile osteoporosis M81.0    Encounter for long-term (current) use of other medications Z79.899    HTN (hypertension), benign I10     System Review: Cardiovascular ROS - taking medications as instructed, no medication side effects noted, patient does not perform home BP monitoring, no TIA's, no chest pain on exertion, no dyspnea on exertion, no swelling of ankles. New concerns: memory has slowed worries a lot  Death of friends. OBJECTIVE:  Visit Vitals    /56 (BP 1 Location: Left arm, BP Patient Position: Sitting)    Pulse 68    Temp 97.8 °F (36.6 °C) (Oral)    Resp 14    Ht 4' 6\" (1.372 m)    Wt 91 lb (41.3 kg)    LMP 02/05/1974    SpO2 98%    BMI 21.94 kg/m2      Appearance: alert, well appearing, and in no distress and oriented to person, place, and time. General exam: CVS exam BP noted to be well controlled today in office, S1, S2 normal, no gallop, no murmur, chest clear, no JVD, no HSM, no edema. Lab review: labs are reviewed, up to date and normal.     ASSESSMENT:  hypertension reasonably well controlled. Can stop statin now at her age  PLAN:  current treatment plan is effective, no change in therapy. Diagnoses and all orders for this visit:    1. Medicare annual wellness visit, subsequent    2. HTN (hypertension), benign    3.  Insomnia, unspecified type      trial melatonin for sleep issues  Later ?  antidepressant

## 2018-02-13 NOTE — PROGRESS NOTES
Coordination of Care Questions    1. Have you been to the ER, urgent care clinic since your last visit? No       Hospitalized since your last visit? No    2. Have you seen or consulted any other health care providers outside of the [address] [place] [address] since your last visit? Include any pap smears or colon screening.  No

## 2018-02-13 NOTE — MR AVS SNAPSHOT
[address] [place] [address] [address] [facility] 
[phone] Patient: Shaila Barry MRN: [id number] ZDC:[birth year] Visit Information Date & Time Provider Department Dept. Phone Encounter #  
 2/13/2018 11:00 AM Jessica Crowe MD [facility] [phone] [id number] Upcoming Health Maintenance Date Due ZOSTER VACCINE AGE 60> 1/15/1981 Pneumococcal 65+ Low/Medium Risk (2 of 2 - PCV13) 9/18/2014 GLAUCOMA SCREENING Q2Y 3/1/2016 MEDICARE YEARLY EXAM 8/4/2017 DTaP/Tdap/Td series (2 - Td) 6/7/2022 Allergies as of 2/13/2018  Review Complete On: 2/13/2018 By: Cruz Torres LPN Severity Noted Reaction Type Reactions Fosamax [Alendronate]  02/05/2014   Side Effect Myalgia Current Immunizations  Reviewed on 10/9/2017 Name Date Influenza High Dose Vaccine PF 10/9/2017, 10/4/2016, 10/13/2015 Influenza Vaccine 9/18/2013 Pneumococcal Polysaccharide (PPSV-23) 9/18/2013 TDAP Vaccine 6/7/2012 Not reviewed this visit You Were Diagnosed With   
  
 Codes Comments Medicare annual wellness visit, subsequent    -  Primary ICD-10-CM: Z00.00 ICD-9-CM: V70.0   
 HTN (hypertension), benign     ICD-10-CM: I10 
ICD-9-CM: 401.1 Insomnia, unspecified type     ICD-10-CM: G47.00 ICD-9-CM: 780.52 Vitals BP Pulse Temp Resp Height(growth percentile) Weight(growth percentile) 133/56 (BP 1 Location: Left arm, BP Patient Position: Sitting) 68 97.8 °F (36.6 °C) (Oral) 14 4' 6\" (1.372 m) 91 lb (41.3 kg) LMP SpO2 BMI OB Status Smoking Status 02/05/1974 98% 21.94 kg/m2 Postmenopausal Never Smoker Vitals History BMI and BSA Data Body Mass Index Body Surface Area  
 21.94 kg/m 2 1.25 m 2 Preferred Pharmacy Pharmacy Name Phone [id number] [place], [address] [phone] Your Updated Medication List  
  
   
This list is accurate as of: 2/13/18 11:27 AM.  Always use your most recent med list. amLODIPine 5 mg tablet Commonly known as:  Arva Brazen TAKE ONE (1) TABLET(S) DAILY losartan-hydroCHLOROthiazide 100-25 mg per tablet Commonly known as:  HYZAAR  
TAKE ONE TABLET BY MOUTH ONCE DAILY  
  
 multivitamin tablet Commonly known as:  ONE A DAY Take 1 Tab by mouth daily. PEPTO-BISMOL PO Take  by mouth as needed. raNITIdine 150 mg tablet Commonly known as:  ZANTAC Take 1 Tab by mouth two (2) times a day. triamcinolone acetonide 0.1 % ointment Commonly known as:  KENALOG Apply  to affected area two (2) times a day. use thin layer Patient Instructions Medicare Wellness Visit, Female The best way to live healthy is to have a healthy lifestyle by eating a well-balanced diet, exercising regularly, limiting alcohol and stopping smoking. Regular physical exams and screening tests are another way to keep healthy. Preventive exams provided by your health care provider can find health problems before they become diseases or illnesses. Preventive services including immunizations, screening tests, monitoring and exams can help you take care of your own health. All people over age 72 should have a pneumovax  and and a prevnar shot to prevent pneumonia. These are once in a lifetime unless you and your provider decide differently. All people over 65 should have a yearly flu shot and a tetanus vaccine every 10 years. A bone mass density to screen for osteoporosis or thinning of the bones should be done every 2 years after 65. Screening for diabetes mellitus with a blood sugar test should be done every year.  
 
Glaucoma is a disease of the eye due to increased ocular pressure that can lead to blindness and it should be done every year by an eye professional. 
 
 Cardiovascular screening tests that check for elevated lipids (fatty part of blood) which can lead to heart disease and strokes should be done every 5 years. Colorectal screening that evaluates for blood or polyps in your colon should be done yearly as a stool test or every five years as a flexible sigmoidoscope or every 10 years as a colonoscopy up to age 76. Breast cancer screening with a mammogram is recommended biennially  for women age 54-69. Screening for cervical cancer with a pap smear and pelvic exam is recommended for women after age 72 years every 2 years up to age 79 or when the provider and patient decide to stop. If there is a history of cervical abnormalities or other increased risk for cancer then the test is recommended yearly. Hepatitis C screening is also recommended for anyone born between [birth year] through [address]. A shingles vaccine is also recommended once in a lifetime after age 61. Your Medicare Wellness Exam is recommended annually. Here is a list of your current Health Maintenance items with a due date: 
Health Maintenance Due Topic Date Due  Shingles Vaccine  01/15/1981  Pneumococcal Vaccine (2 of 2 - PCV13) 09/18/2014  Glaucoma Screening   03/01/2016 [facility] Annual Well Visit  08/04/2017 [facility]! Dear Lynette 70: Thank you for requesting a [employer] account. Our records indicate that you already have an active [employer] account. You can access your account anytime at [email or web address] Did you know that you can access your hospital and ER discharge instructions at any time in [employer]? You can also review all of your test results from your hospital stay or ER visit. Additional Information If you have questions, please visit the Frequently Asked Questions section of the [employer] website at [email or web address] Remember, [employer] is NOT to be used for urgent needs.  For medical emergencies, dial 911. Now available from your iPhone and Android! Please provide this summary of care documentation to your next provider. Your primary care clinician is listed as Flora Pelaez. If you have any questions after today's visit, please call [phone].

## 2018-03-26 ENCOUNTER — TELEPHONE (OUTPATIENT)
Dept: INTERNAL MEDICINE CLINIC | Age: 83
End: 2018-03-26

## 2018-03-26 NOTE — TELEPHONE ENCOUNTER
Patient called back, she states Dr Ayesha Abdul has discontinued the simvastatin but she keeps getting calls from the pharmacy to refill them, writer called the pharmacy to discontinue medication because patient is no longer taking them.

## 2018-06-17 DIAGNOSIS — E78.5 DYSLIPIDEMIA, GOAL LDL BELOW 100: ICD-10-CM

## 2018-06-17 RX ORDER — LOSARTAN POTASSIUM AND HYDROCHLOROTHIAZIDE 25; 100 MG/1; MG/1
TABLET ORAL
Qty: 90 TAB | Refills: 1 | Status: SHIPPED | OUTPATIENT
Start: 2018-06-17 | End: 2018-10-08 | Stop reason: SDUPTHER

## 2018-06-17 RX ORDER — AMLODIPINE BESYLATE 5 MG/1
TABLET ORAL
Qty: 90 TAB | Refills: 1 | Status: SHIPPED | OUTPATIENT
Start: 2018-06-17 | End: 2018-10-09 | Stop reason: SDUPTHER

## 2018-07-05 ENCOUNTER — OFFICE VISIT (OUTPATIENT)
Dept: INTERNAL MEDICINE CLINIC | Age: 83
End: 2018-07-05

## 2018-07-05 VITALS
BODY MASS INDEX: 20.25 KG/M2 | WEIGHT: 87.5 LBS | HEART RATE: 79 BPM | RESPIRATION RATE: 12 BRPM | HEIGHT: 55 IN | SYSTOLIC BLOOD PRESSURE: 140 MMHG | DIASTOLIC BLOOD PRESSURE: 64 MMHG | TEMPERATURE: 98 F | OXYGEN SATURATION: 98 %

## 2018-07-05 DIAGNOSIS — S49.92XA SHOULDER INJURY, LEFT, INITIAL ENCOUNTER: Primary | ICD-10-CM

## 2018-07-05 NOTE — PROGRESS NOTES
1. Have you been to the ER, urgent care clinic since your last visit? Hospitalized since your last visit? No    2. Have you seen or consulted any other health care providers outside of the [address] [place] [address] since your last visit? Include any pap smears or colon screening. No     Chief Complaint   Patient presents with    Shoulder Injury     Pt states fall injury Left shoulder on 7/3/2018. Pt states pain occurs with ROM. Pt states unable to lift left arm above head. Pt states painful to the touch.      Health Maintenance Due   Topic Date Due    ZOSTER VACCINE AGE 60>  01/15/1981    Pneumococcal 65+ Low/Medium Risk (2 of 2 - PCV13) 09/18/2014    GLAUCOMA SCREENING Q2Y  03/01/2016

## 2018-07-05 NOTE — PROGRESS NOTES
Chief Complaint   Patient presents with    Shoulder Injury     Pt states fall injury Left shoulder on 7/3/2018. Pt states pain occurs with ROM. Pt states unable to lift left arm above head. Pt states painful to the touch. SUBJECTIVE:  Mariana Mares is a 80 y.o. female who sustained a left shoulder injury fell  While on knees day(s) ago. Mechanism of injury: fall. Immediate symptoms: immediate pain, inability to use arm directly after injury. Symptoms have been acute since that time. Prior history of related problems: no prior problems with this area in the past.    OBJECTIVE:  Vital signs as noted above. Appearance: alert, well appearing, and in no distress. Shoulder exam: soft tissue tenderness and swelling at the acromian, subdeltoid tenderness. X-ray: ordered, but results not yet available. ASSESSMENT:  Shoulder sprain    PLAN:  rest the injured area as much as practical, apply ice packs, elevate the injured limb  See orders for this visit as documented in the electronic medical record. Diagnoses and all orders for this visit:    1.  Shoulder injury, left, initial encounter  -     XR SHOULDER LT AP/LAT MIN 2 V; Future

## 2018-07-06 ENCOUNTER — TELEPHONE (OUTPATIENT)
Dept: INTERNAL MEDICINE CLINIC | Age: 83
End: 2018-07-06

## 2018-07-06 NOTE — TELEPHONE ENCOUNTER
Writer contacted patient to inform of xray results and instruction per Dr. Khris Chan, patient verbalized understanding.

## 2018-08-14 ENCOUNTER — OFFICE VISIT (OUTPATIENT)
Dept: INTERNAL MEDICINE CLINIC | Age: 83
End: 2018-08-14

## 2018-08-14 VITALS
HEIGHT: 55 IN | OXYGEN SATURATION: 98 % | RESPIRATION RATE: 17 BRPM | WEIGHT: 85.8 LBS | SYSTOLIC BLOOD PRESSURE: 128 MMHG | HEART RATE: 65 BPM | DIASTOLIC BLOOD PRESSURE: 50 MMHG | BODY MASS INDEX: 19.86 KG/M2 | TEMPERATURE: 97.7 F

## 2018-08-14 DIAGNOSIS — Z71.3 WEIGHT LOSS COUNSELING, ENCOUNTER FOR: Primary | ICD-10-CM

## 2018-08-14 DIAGNOSIS — S43.422D SPRAIN OF LEFT ROTATOR CUFF CAPSULE, SUBSEQUENT ENCOUNTER: ICD-10-CM

## 2018-08-14 NOTE — PROGRESS NOTES
Chief Complaint   Patient presents with    Follow Up Chronic Condition       1. Have you been to the ER, urgent care clinic since your last visit? Hospitalized since your last visit? No    2. Have you seen or consulted any other health care providers outside of the [facility] since your last visit? Include any pap smears or colon screening.  No

## 2018-08-14 NOTE — MR AVS SNAPSHOT
[address] [place] [address] [address] [facility] 
[phone] Patient: Samantha Lainez MRN: [id number] ZGK:1/42/1983 Visit Information Date & Time Provider Department Dept. Phone Encounter #  
 8/14/2018  1:45 PM Neo Parent, Y[address] [place] [address] [facility] [phone] [id number] Upcoming Health Maintenance Date Due ZOSTER VACCINE AGE 60> 1/15/1981 Pneumococcal 65+ Low/Medium Risk (2 of 2 - PCV13) 9/18/2014 GLAUCOMA SCREENING Q2Y 3/1/2016 Influenza Age 5 to Adult 8/1/2018 MEDICARE YEARLY EXAM 2/14/2019 DTaP/Tdap/Td series (2 - Td) 6/7/2022 Allergies as of 8/14/2018  Review Complete On: 8/14/2018 By: Mckenzie Garcia Severity Noted Reaction Type Reactions Fosamax [Alendronate]  02/05/2014   Side Effect Myalgia Current Immunizations  Reviewed on 10/9/2017 Name Date Influenza High Dose Vaccine PF 10/9/2017, 10/4/2016, 10/13/2015 Influenza Vaccine 9/18/2013 Pneumococcal Polysaccharide (PPSV-23) 9/18/2013 TDAP Vaccine 6/7/2012 Not reviewed this visit Vitals BP Pulse Temp Resp Height(growth percentile) Weight(growth percentile) 128/50 65 97.7 °F (36.5 °C) (Oral) 17 4' 6\" (1.372 m) 85 lb 12.8 oz (38.9 kg) LMP SpO2 BMI OB Status Smoking Status 02/05/1974 98% 20.69 kg/m2 Postmenopausal Never Smoker Vitals History BMI and BSA Data Body Mass Index Body Surface Area  
 20.69 kg/m 2 1.22 m 2 Preferred Pharmacy Pharmacy Name Phone [id number] [place], [address] [phone] Your Updated Medication List  
  
   
This list is accurate as of 8/14/18  2:14 PM.  Always use your most recent med list. amLODIPine 5 mg tablet Commonly known as:  Samir Bills TAKE ONE (1) TABLET(S) DAILY losartan-hydroCHLOROthiazide 100-25 mg per tablet Commonly known as:  HYZAAR  
 TAKE ONE TABLET BY MOUTH ONCE DAILY  
  
 multivitamin tablet Commonly known as:  ONE A DAY Take 1 Tab by mouth daily. PEPTO-BISMOL PO Take  by mouth as needed. raNITIdine 150 mg tablet Commonly known as:  ZANTAC Take 1 Tab by mouth two (2) times a day. triamcinolone acetonide 0.1 % ointment Commonly known as:  KENALOG Apply  to affected area two (2) times a day. use thin layer Introducing [facility] SERVICES! Dear Mike Gomez: Thank you for requesting a [employer] account. Our records indicate that you already have an active [employer] account. You can access your account anytime at [email or web address] Did you know that you can access your hospital and ER discharge instructions at any time in [employer]? You can also review all of your test results from your hospital stay or ER visit. Additional Information If you have questions, please visit the Frequently Asked Questions section of the [employer] website at [email or web address] Remember, [employer] is NOT to be used for urgent needs. For medical emergencies, dial 911. Now available from your iPhone and Android! Please provide this summary of care documentation to your next provider. Your primary care clinician is listed as Rodriguez Champagne. If you have any questions after today's visit, please call [phone].

## 2018-08-14 NOTE — PROGRESS NOTES
Chief Complaint   Patient presents with    Follow Up Chronic Condition    Decreased Appetite     Missing meals  Shoulder better  But still some pain  Xray no fracture    Subjective:  Family believes the patient is starting to show cognitive decline and is not able to do all her own ADLs. She eats a little bit of breakfast, little bit of lunch and gets no dinner. She has frozen food in her freezer, but never uses it. She doesn't use the microwave much. Occasionally uses toaster oven. She's lost some weight and lost some strength. She has intermittent back pain, which is chronic, and intermittent shoulder pain in both shoulders. Physical Examination:  Her weight is down, blood pressure is normal.  S1, S2 regular. She was alert and oriented. ROM of left shoulder a little bit reduced, but much better than when seen a month ago. Impression/Plan:  Shoulder sprain is an issue, cognitive decline is an issue, weight loss is an issue. I recommended the family call Meals [employer] Wheels and arrange one hot meal a day. Recommended they continue to push her, that she may need extra help at home, they need to start looking into those alternatives. Vitals:    08/14/18 1352   BP: 128/50   Pulse: 65   Resp: 17   Temp: 97.7 °F (36.5 °C)   TempSrc: Oral   SpO2: 98%   Weight: 85 lb 12.8 oz (38.9 kg)   Height: 4' 6\" (1.372 m)     Wt Readings from Last 3 Encounters:   08/14/18 85 lb 12.8 oz (38.9 kg)   07/05/18 87 lb 8 oz (39.7 kg)   02/13/18 91 lb (41.3 kg)         Diagnoses and all orders for this visit:    1. Weight loss counseling, encounter for    2.  Sprain of left rotator cuff capsule, subsequent encounter

## 2018-09-04 ENCOUNTER — TELEPHONE (OUTPATIENT)
Dept: INTERNAL MEDICINE CLINIC | Age: 83
End: 2018-09-04

## 2018-09-04 NOTE — TELEPHONE ENCOUNTER
Pt is stating that she will not be changing Pharmacies just yet. Best contact number [phone]. From [facility]

## 2018-10-08 RX ORDER — LOSARTAN POTASSIUM AND HYDROCHLOROTHIAZIDE 25; 100 MG/1; MG/1
TABLET ORAL
Qty: 90 TAB | Refills: 1 | Status: SHIPPED | OUTPATIENT
Start: 2018-10-08 | End: 2019-01-01 | Stop reason: SDUPTHER

## 2018-10-09 DIAGNOSIS — E78.5 DYSLIPIDEMIA, GOAL LDL BELOW 100: ICD-10-CM

## 2018-10-09 RX ORDER — AMLODIPINE BESYLATE 5 MG/1
TABLET ORAL
Qty: 90 TAB | Refills: 1 | Status: SHIPPED | OUTPATIENT
Start: 2018-10-09 | End: 2019-01-01 | Stop reason: SDUPTHER

## 2018-11-01 ENCOUNTER — OFFICE VISIT (OUTPATIENT)
Dept: INTERNAL MEDICINE CLINIC | Age: 83
End: 2018-11-01

## 2018-11-01 VITALS
DIASTOLIC BLOOD PRESSURE: 46 MMHG | OXYGEN SATURATION: 98 % | BODY MASS INDEX: 20.04 KG/M2 | WEIGHT: 86.6 LBS | HEART RATE: 82 BPM | SYSTOLIC BLOOD PRESSURE: 127 MMHG | HEIGHT: 55 IN | RESPIRATION RATE: 16 BRPM | TEMPERATURE: 97.1 F

## 2018-11-01 DIAGNOSIS — H61.21 IMPACTED CERUMEN OF RIGHT EAR: Primary | ICD-10-CM

## 2018-11-01 NOTE — PROGRESS NOTES
1. Have you been to the ER, urgent care clinic since your last visit? Hospitalized since your last visit? No 
 
2. Have you seen or consulted any other health care providers outside of the [address] [place] [address] since your last visit? Include any pap smears or colon screening.  No

## 2018-11-01 NOTE — PROGRESS NOTES
Subjective:  
 
Fanny Bonilla is a 80 y.o. female who presents for evaluation of a plugged ear. She has noticed right symptoms 2 week ago. There is a prior history of cerumen impaction. Patient denies ear pain. Patient has hearing loss. Objective:  
 
Visit Vitals /46 (BP 1 Location: Left arm, BP Patient Position: Sitting) Pulse 82 Temp 97.1 °F (36.2 °C) (Oral) Resp 16 Ht 4' 6\" (1.372 m) Wt 86 lb 9.6 oz (39.3 kg) LMP 02/05/1974 SpO2 98% BMI 20.88 kg/m² General: alert, cooperative, no distress Right Ear: ceruminosis noted, cerumen removed. Left Ear:  left ear normal.   
After removal: bilateral TM's and external ear canals normal, cerumen removed. Oropharynx:   normal.  
Neck:  supple, symmetrical, trachea midline and no adenopathy. Assessment/Plan:  
 
Cerumen Impaction, without otitis externa. 1. Cerumen removed by flushing, candice. 2. Care instructions given. 3. Home treatment: none 4. Followup as needed. Diagnoses and all orders for this visit: 
 
1. Impacted cerumen of right ear -     REMOVE IMPACTED EAR WAX Дмитрий Stokes home treatment of daily oil and monthly flushing with OTC ear wax kit

## 2019-01-01 ENCOUNTER — OFFICE VISIT (OUTPATIENT)
Dept: INTERNAL MEDICINE CLINIC | Age: 84
End: 2019-01-01

## 2019-01-01 ENCOUNTER — HOSPITAL ENCOUNTER (EMERGENCY)
Age: 84
Discharge: HOME | End: 2019-12-12
Attending: EMERGENCY MEDICINE
Payer: MEDICARE

## 2019-01-01 ENCOUNTER — TELEPHONE (OUTPATIENT)
Dept: INTERNAL MEDICINE CLINIC | Age: 84
End: 2019-01-01

## 2019-01-01 ENCOUNTER — APPOINTMENT (OUTPATIENT)
Dept: GENERAL RADIOLOGY | Age: 84
End: 2019-01-01
Attending: EMERGENCY MEDICINE
Payer: MEDICARE

## 2019-01-01 ENCOUNTER — HOSPITAL ENCOUNTER (OUTPATIENT)
Dept: LAB | Age: 84
Discharge: HOME | End: 2019-09-24
Payer: MEDICARE

## 2019-01-01 ENCOUNTER — HOSPITAL ENCOUNTER (OUTPATIENT)
Dept: LAB | Age: 84
Discharge: HOME | End: 2019-11-25
Payer: MEDICARE

## 2019-01-01 VITALS
HEIGHT: 57 IN | SYSTOLIC BLOOD PRESSURE: 112 MMHG | BODY MASS INDEX: 16.18 KG/M2 | RESPIRATION RATE: 20 BRPM | DIASTOLIC BLOOD PRESSURE: 61 MMHG | HEART RATE: 76 BPM | WEIGHT: 75 LBS | OXYGEN SATURATION: 99 %

## 2019-01-01 VITALS
WEIGHT: 73 LBS | OXYGEN SATURATION: 98 % | DIASTOLIC BLOOD PRESSURE: 50 MMHG | RESPIRATION RATE: 18 BRPM | HEIGHT: 55 IN | TEMPERATURE: 96.7 F | BODY MASS INDEX: 16.89 KG/M2 | HEART RATE: 72 BPM | SYSTOLIC BLOOD PRESSURE: 113 MMHG

## 2019-01-01 VITALS
HEIGHT: 55 IN | TEMPERATURE: 97 F | DIASTOLIC BLOOD PRESSURE: 72 MMHG | OXYGEN SATURATION: 100 % | SYSTOLIC BLOOD PRESSURE: 154 MMHG | WEIGHT: 83 LBS | HEART RATE: 75 BPM | BODY MASS INDEX: 19.21 KG/M2 | RESPIRATION RATE: 18 BRPM

## 2019-01-01 VITALS
RESPIRATION RATE: 16 BRPM | HEART RATE: 63 BPM | HEIGHT: 55 IN | BODY MASS INDEX: 19.44 KG/M2 | DIASTOLIC BLOOD PRESSURE: 72 MMHG | SYSTOLIC BLOOD PRESSURE: 153 MMHG | WEIGHT: 84 LBS | OXYGEN SATURATION: 98 % | TEMPERATURE: 96.6 F

## 2019-01-01 VITALS
HEIGHT: 57 IN | WEIGHT: 74 LBS | HEART RATE: 79 BPM | SYSTOLIC BLOOD PRESSURE: 117 MMHG | BODY MASS INDEX: 15.97 KG/M2 | TEMPERATURE: 98.7 F | OXYGEN SATURATION: 94 % | RESPIRATION RATE: 18 BRPM | DIASTOLIC BLOOD PRESSURE: 61 MMHG

## 2019-01-01 DIAGNOSIS — G30.1 LATE ONSET ALZHEIMER'S DISEASE WITH BEHAVIORAL DISTURBANCE (HCC): Primary | ICD-10-CM

## 2019-01-01 DIAGNOSIS — Z74.1 REQUIRES ASSISTANCE WITH ACTIVITIES OF DAILY LIVING (ADL): ICD-10-CM

## 2019-01-01 DIAGNOSIS — E78.5 DYSLIPIDEMIA, GOAL LDL BELOW 100: ICD-10-CM

## 2019-01-01 DIAGNOSIS — F22 PARANOIA (PSYCHOSIS) (HCC): Primary | ICD-10-CM

## 2019-01-01 DIAGNOSIS — Z13.31 SCREENING FOR DEPRESSION: ICD-10-CM

## 2019-01-01 DIAGNOSIS — R63.4 ABNORMAL LOSS OF WEIGHT: ICD-10-CM

## 2019-01-01 DIAGNOSIS — R41.89 COGNITIVE DECLINE: Primary | ICD-10-CM

## 2019-01-01 DIAGNOSIS — F02.81 ALZHEIMER'S DEMENTIA WITH BEHAVIORAL DISTURBANCE, UNSPECIFIED TIMING OF DEMENTIA ONSET: Primary | ICD-10-CM

## 2019-01-01 DIAGNOSIS — N18.30 CKD (CHRONIC KIDNEY DISEASE) STAGE 3, GFR 30-59 ML/MIN (HCC): ICD-10-CM

## 2019-01-01 DIAGNOSIS — R53.1 WEAKNESS: Primary | ICD-10-CM

## 2019-01-01 DIAGNOSIS — E86.0 DEHYDRATION: ICD-10-CM

## 2019-01-01 DIAGNOSIS — G30.9 ALZHEIMER'S DEMENTIA WITH BEHAVIORAL DISTURBANCE, UNSPECIFIED TIMING OF DEMENTIA ONSET: Primary | ICD-10-CM

## 2019-01-01 DIAGNOSIS — B02.9 HERPES ZOSTER WITHOUT COMPLICATION: Primary | ICD-10-CM

## 2019-01-01 DIAGNOSIS — Z11.1 SCREENING-PULMONARY TB: ICD-10-CM

## 2019-01-01 DIAGNOSIS — R41.89 COGNITIVE DECLINE: ICD-10-CM

## 2019-01-01 DIAGNOSIS — B02.9 HERPES ZOSTER WITHOUT COMPLICATION: ICD-10-CM

## 2019-01-01 DIAGNOSIS — F02.818 LATE ONSET ALZHEIMER'S DISEASE WITH BEHAVIORAL DISTURBANCE (HCC): Primary | ICD-10-CM

## 2019-01-01 DIAGNOSIS — F34.1 DYSTHYMIA: ICD-10-CM

## 2019-01-01 DIAGNOSIS — R77.8 ELEVATED TROPONIN: ICD-10-CM

## 2019-01-01 DIAGNOSIS — Z00.00 MEDICARE ANNUAL WELLNESS VISIT, SUBSEQUENT: ICD-10-CM

## 2019-01-01 DIAGNOSIS — I10 HTN (HYPERTENSION), BENIGN: ICD-10-CM

## 2019-01-01 LAB
ALBUMIN SERPL-MCNC: 3 G/DL (ref 3.5–5)
ALBUMIN/GLOB SERPL: 0.8 {RATIO} (ref 1.1–2.2)
ALP SERPL-CCNC: 73 U/L (ref 45–117)
ALT SERPL-CCNC: 20 U/L (ref 12–78)
ANION GAP SERPL CALC-SCNC: 5 MMOL/L (ref 5–15)
APPEARANCE UR: CLEAR
AST SERPL-CCNC: 27 U/L (ref 15–37)
ATRIAL RATE: 95 BPM
BACTERIA URNS QL MICRO: NEGATIVE /HPF
BASOPHILS # BLD: 0 K/UL (ref 0–0.1)
BASOPHILS NFR BLD: 0 % (ref 0–1)
BILIRUB SERPL-MCNC: 0.3 MG/DL (ref 0.2–1)
BILIRUB UR QL: NEGATIVE
BUN SERPL-MCNC: 42 MG/DL (ref 6–20)
BUN/CREAT SERPL: 31 (ref 12–20)
CALCIUM SERPL-MCNC: 8.7 MG/DL (ref 8.5–10.1)
CALCULATED T AXIS, ECG11: 179 DEGREES
CHLORIDE SERPL-SCNC: 97 MMOL/L (ref 97–108)
CO2 SERPL-SCNC: 27 MMOL/L (ref 21–32)
COLOR UR: ABNORMAL
COMMENT, HOLDF: NORMAL
CREAT SERPL-MCNC: 1.36 MG/DL (ref 0.55–1.02)
DIAGNOSIS, 93000: NORMAL
DIFFERENTIAL METHOD BLD: ABNORMAL
EOSINOPHIL # BLD: 0 K/UL (ref 0–0.4)
EOSINOPHIL NFR BLD: 0 % (ref 0–7)
EPITH CASTS URNS QL MICRO: ABNORMAL /LPF
ERYTHROCYTE [DISTWIDTH] IN BLOOD BY AUTOMATED COUNT: 13.2 % (ref 11.5–14.5)
GAMMA INTERFERON BACKGROUND BLD IA-ACNC: 0.02 IU/ML
GAMMA INTERFERON BACKGROUND BLD IA-ACNC: 0.04 IU/ML
GLOBULIN SER CALC-MCNC: 3.6 G/DL (ref 2–4)
GLUCOSE SERPL-MCNC: 135 MG/DL (ref 65–100)
GLUCOSE UR STRIP.AUTO-MCNC: NEGATIVE MG/DL
HCT VFR BLD AUTO: 41.3 % (ref 35–47)
HGB BLD-MCNC: 13.6 G/DL (ref 11.5–16)
HGB UR QL STRIP: NEGATIVE
HYALINE CASTS URNS QL MICRO: ABNORMAL /LPF (ref 0–5)
IMM GRANULOCYTES # BLD AUTO: 0 K/UL (ref 0–0.04)
IMM GRANULOCYTES NFR BLD AUTO: 0 % (ref 0–0.5)
KETONES UR QL STRIP.AUTO: NEGATIVE MG/DL
LACTATE BLD-SCNC: 2.53 MMOL/L (ref 0.4–2)
LEUKOCYTE ESTERASE UR QL STRIP.AUTO: NEGATIVE
LYMPHOCYTES # BLD: 0.7 K/UL (ref 0.8–3.5)
LYMPHOCYTES NFR BLD: 9 % (ref 12–49)
M TB IFN-G BLD-IMP: NEGATIVE
M TB IFN-G BLD-IMP: NEGATIVE
M TB IFN-G CD4+ BCKGRND COR BLD-ACNC: 0.02 IU/ML
M TB IFN-G CD4+ BCKGRND COR BLD-ACNC: 0.04 IU/ML
MAGNESIUM SERPL-MCNC: 2.3 MG/DL (ref 1.6–2.4)
MCH RBC QN AUTO: 28.9 PG (ref 26–34)
MCHC RBC AUTO-ENTMCNC: 32.9 G/DL (ref 30–36.5)
MCV RBC AUTO: 87.9 FL (ref 80–99)
MITOGEN IGNF BLD-ACNC: 9.63 IU/ML
MITOGEN IGNF BLD-ACNC: >10 IU/ML
MONOCYTES # BLD: 0.9 K/UL (ref 0–1)
MONOCYTES NFR BLD: 11 % (ref 5–13)
NEUTS SEG # BLD: 6.7 K/UL (ref 1.8–8)
NEUTS SEG NFR BLD: 80 % (ref 32–75)
NITRITE UR QL STRIP.AUTO: NEGATIVE
NRBC # BLD: 0 K/UL (ref 0–0.01)
NRBC BLD-RTO: 0 PER 100 WBC
P-R INTERVAL, ECG05: 152 MS
PH UR STRIP: 5.5 [PH] (ref 5–8)
PLATELET # BLD AUTO: 283 K/UL (ref 150–400)
PLATELET COMMENTS,PCOM: ABNORMAL
PMV BLD AUTO: 11.3 FL (ref 8.9–12.9)
POTASSIUM SERPL-SCNC: 3.8 MMOL/L (ref 3.5–5.1)
PROT SERPL-MCNC: 6.6 G/DL (ref 6.4–8.2)
PROT UR STRIP-MCNC: 30 MG/DL
Q-T INTERVAL, ECG07: 340 MS
QRS DURATION, ECG06: 70 MS
QTC CALCULATION (BEZET), ECG08: 427 MS
QUANTIFERON INCUBATION, QF1T: NORMAL
QUANTIFERON INCUBATION, QF1T: NORMAL
QUANTIFERON TB2 AG: 0.02 IU/ML
QUANTIFERON TB2 AG: 0.04 IU/ML
RBC # BLD AUTO: 4.7 M/UL (ref 3.8–5.2)
RBC #/AREA URNS HPF: ABNORMAL /HPF (ref 0–5)
RBC MORPH BLD: ABNORMAL
SAMPLES BEING HELD,HOLD: NORMAL
SERVICE CMNT-IMP: NORMAL
SERVICE CMNT-IMP: NORMAL
SODIUM SERPL-SCNC: 129 MMOL/L (ref 136–145)
SP GR UR REFRACTOMETRY: 1.02 (ref 1–1.03)
TROPONIN I SERPL-MCNC: 0.34 NG/ML
UA: UC IF INDICATED,UAUC: ABNORMAL
UROBILINOGEN UR QL STRIP.AUTO: 0.2 EU/DL (ref 0.2–1)
VENTRICULAR RATE, ECG03: 95 BPM
WBC # BLD AUTO: 8.3 K/UL (ref 3.6–11)
WBC URNS QL MICRO: ABNORMAL /HPF (ref 0–4)

## 2019-01-01 PROCEDURE — 96360 HYDRATION IV INFUSION INIT: CPT

## 2019-01-01 PROCEDURE — 36415 COLL VENOUS BLD VENIPUNCTURE: CPT

## 2019-01-01 PROCEDURE — 99283 EMERGENCY DEPT VISIT LOW MDM: CPT

## 2019-01-01 PROCEDURE — 93005 ELECTROCARDIOGRAM TRACING: CPT

## 2019-01-01 PROCEDURE — 80053 COMPREHEN METABOLIC PANEL: CPT

## 2019-01-01 PROCEDURE — 83735 ASSAY OF MAGNESIUM: CPT

## 2019-01-01 PROCEDURE — 96361 HYDRATE IV INFUSION ADD-ON: CPT

## 2019-01-01 PROCEDURE — 71045 X-RAY EXAM CHEST 1 VIEW: CPT

## 2019-01-01 PROCEDURE — 74011250636 HC RX REV CODE- 250/636: Performed by: EMERGENCY MEDICINE

## 2019-01-01 PROCEDURE — 74011250637 HC RX REV CODE- 250/637: Performed by: EMERGENCY MEDICINE

## 2019-01-01 PROCEDURE — 86480 TB TEST CELL IMMUN MEASURE: CPT

## 2019-01-01 PROCEDURE — 77030019905 HC CATH URETH INTMIT MDII -A

## 2019-01-01 PROCEDURE — 85025 COMPLETE CBC W/AUTO DIFF WBC: CPT

## 2019-01-01 PROCEDURE — 84484 ASSAY OF TROPONIN QUANT: CPT

## 2019-01-01 PROCEDURE — 81001 URINALYSIS AUTO W/SCOPE: CPT

## 2019-01-01 PROCEDURE — 83605 ASSAY OF LACTIC ACID: CPT

## 2019-01-01 RX ORDER — AMLODIPINE BESYLATE 5 MG/1
TABLET ORAL
Qty: 90 TAB | Refills: 1 | Status: SHIPPED | OUTPATIENT
Start: 2019-01-01 | End: 2019-01-01 | Stop reason: SDUPTHER

## 2019-01-01 RX ORDER — SERTRALINE HYDROCHLORIDE 25 MG/1
TABLET, FILM COATED ORAL
Qty: 90 TAB | Refills: 0 | Status: SHIPPED | OUTPATIENT
Start: 2019-01-01 | End: 2019-01-01 | Stop reason: ALTCHOICE

## 2019-01-01 RX ORDER — AMLODIPINE BESYLATE 5 MG/1
TABLET ORAL
Qty: 90 TAB | Refills: 1 | Status: SHIPPED | OUTPATIENT
Start: 2019-01-01 | End: 2019-01-01 | Stop reason: ALTCHOICE

## 2019-01-01 RX ORDER — LOSARTAN POTASSIUM 100 MG/1
100 TABLET ORAL DAILY
Qty: 90 TAB | Refills: 3 | Status: SHIPPED | OUTPATIENT
Start: 2019-01-01 | End: 2019-01-01 | Stop reason: ALTCHOICE

## 2019-01-01 RX ORDER — SERTRALINE HYDROCHLORIDE 25 MG/1
25 TABLET, FILM COATED ORAL DAILY
Qty: 90 TAB | Refills: 0 | Status: SHIPPED | OUTPATIENT
Start: 2019-01-01 | End: 2019-01-01 | Stop reason: SDUPTHER

## 2019-01-01 RX ORDER — ACYCLOVIR 800 MG/1
800 TABLET ORAL
Qty: 50 TAB | Refills: 0 | Status: SHIPPED | OUTPATIENT
Start: 2019-01-01 | End: 2019-01-01

## 2019-01-01 RX ORDER — ASPIRIN 325 MG
325 TABLET ORAL ONCE
Status: COMPLETED | OUTPATIENT
Start: 2019-01-01 | End: 2019-01-01

## 2019-01-01 RX ORDER — HYDROCHLOROTHIAZIDE 25 MG/1
25 TABLET ORAL DAILY
Qty: 90 TAB | Refills: 3 | Status: SHIPPED | OUTPATIENT
Start: 2019-01-01 | End: 2019-01-01 | Stop reason: ALTCHOICE

## 2019-01-01 RX ORDER — LOSARTAN POTASSIUM AND HYDROCHLOROTHIAZIDE 25; 100 MG/1; MG/1
TABLET ORAL
Qty: 90 TAB | Refills: 1 | Status: SHIPPED | OUTPATIENT
Start: 2019-01-01 | End: 2019-01-01 | Stop reason: SDUPTHER

## 2019-01-01 RX ORDER — DEXTROSE MONOHYDRATE AND SODIUM CHLORIDE 5; .45 G/100ML; G/100ML
125 INJECTION, SOLUTION INTRAVENOUS CONTINUOUS
Status: DISCONTINUED | OUTPATIENT
Start: 2019-01-01 | End: 2019-01-01

## 2019-01-01 RX ORDER — LOSARTAN POTASSIUM AND HYDROCHLOROTHIAZIDE 25; 100 MG/1; MG/1
1 TABLET ORAL DAILY
Qty: 90 TAB | Refills: 1 | Status: SHIPPED | OUTPATIENT
Start: 2019-01-01 | End: 2019-01-01 | Stop reason: SDUPTHER

## 2019-01-01 RX ORDER — LOSARTAN POTASSIUM AND HYDROCHLOROTHIAZIDE 25; 100 MG/1; MG/1
1 TABLET ORAL DAILY
Qty: 90 TAB | Refills: 1 | Status: SHIPPED | OUTPATIENT
Start: 2019-01-01 | End: 2019-01-01 | Stop reason: ALTCHOICE

## 2019-01-01 RX ADMIN — ASPIRIN 325 MG: 325 TABLET, FILM COATED ORAL at 00:45

## 2019-01-01 RX ADMIN — SODIUM CHLORIDE 1000 ML: 900 INJECTION, SOLUTION INTRAVENOUS at 22:50

## 2019-01-08 ENCOUNTER — OFFICE VISIT (OUTPATIENT)
Dept: INTERNAL MEDICINE CLINIC | Age: 84
End: 2019-01-08

## 2019-01-08 VITALS
HEIGHT: 55 IN | OXYGEN SATURATION: 97 % | SYSTOLIC BLOOD PRESSURE: 122 MMHG | RESPIRATION RATE: 18 BRPM | DIASTOLIC BLOOD PRESSURE: 50 MMHG | BODY MASS INDEX: 20.13 KG/M2 | WEIGHT: 87 LBS | HEART RATE: 70 BPM | TEMPERATURE: 97.1 F

## 2019-01-08 DIAGNOSIS — I10 HTN (HYPERTENSION), BENIGN: Primary | ICD-10-CM

## 2019-01-08 DIAGNOSIS — R41.89 COGNITIVE DECLINE: ICD-10-CM

## 2019-01-08 NOTE — PROGRESS NOTES
Chief Complaint   Patient presents with    Hypertension    Osteoporosis    Cholesterol Problem    Dementia     Lives alone   sundowns every late afternoon    Family wants her to move sometime or get aide 4 hours a day    Long discussion with family    Patient Active Problem List    Diagnosis    HTN (hypertension), benign    Encounter for long-term (current) use of other medications    Senile osteoporosis     Intolerant to fosamax  Not in favor of insion or injection  Refused reclast      Eczema    Dyslipidemia, goal LDL below 100    Cognitive decline     Vitals:    01/08/19 1527   BP: 122/50   Pulse: 70   Resp: 18   Temp: 97.1 °F (36.2 °C)   TempSrc: Oral   SpO2: 97%   Weight: 87 lb (39.5 kg)   Height: 4' 6\" (1.372 m)     Lab Results   Component Value Date/Time    GFR est AA 41 (L) 02/06/2017 03:54 PM    GFR est non-AA 36 (L) 02/06/2017 03:54 PM    Creatinine 1.28 (H) 02/06/2017 03:54 PM    BUN 27 02/06/2017 03:54 PM    Sodium 144 02/06/2017 03:54 PM    Potassium 4.9 02/06/2017 03:54 PM    Chloride 103 02/06/2017 03:54 PM    CO2 25 02/06/2017 03:54 PM     Lab Results   Component Value Date/Time    Cholesterol, total 169 02/06/2017 03:54 PM    HDL Cholesterol 64 02/06/2017 03:54 PM    LDL, calculated 51 02/06/2017 03:54 PM    VLDL, calculated 54 (H) 02/06/2017 03:54 PM    Triglyceride 272 (H) 02/06/2017 03:54 PM   ,    1. HTN (hypertension), benign  controlled    2.  Cognitive decline  Advise needs 4 hours at home in afternoon versus moving GIFTY

## 2019-01-08 NOTE — PROGRESS NOTES
1. Have you been to the ER, urgent care clinic since your last visit? Hospitalized since your last visit? No    2. Have you seen or consulted any other health care providers outside of the [address] [place] [address] since your last visit? Include any pap smears or colon screening. Yes.   ophthamology- dr Chen Barker

## 2019-02-05 DIAGNOSIS — S49.92XA SHOULDER INJURY, LEFT, INITIAL ENCOUNTER: ICD-10-CM

## 2019-02-13 ENCOUNTER — OFFICE VISIT (OUTPATIENT)
Dept: INTERNAL MEDICINE CLINIC | Age: 84
End: 2019-02-13

## 2019-02-13 VITALS
HEART RATE: 73 BPM | DIASTOLIC BLOOD PRESSURE: 50 MMHG | BODY MASS INDEX: 20.13 KG/M2 | WEIGHT: 87 LBS | TEMPERATURE: 98.3 F | RESPIRATION RATE: 18 BRPM | SYSTOLIC BLOOD PRESSURE: 135 MMHG | OXYGEN SATURATION: 96 % | HEIGHT: 55 IN

## 2019-02-13 DIAGNOSIS — R41.89 COGNITIVE DECLINE: ICD-10-CM

## 2019-02-13 DIAGNOSIS — I10 HTN (HYPERTENSION), BENIGN: Primary | ICD-10-CM

## 2019-02-13 NOTE — PROGRESS NOTES
Chief Complaint Patient presents with  Hypertension 6 months follow up Manohar Rushing is a 80 y.o. female with the following Problems and Medications. Patient Active Problem List  
Diagnosis Code  Dyslipidemia, goal LDL below 100 E78.5  Cognitive decline R41.89  
 Eczema L30.9  Senile osteoporosis M81.0  
 Encounter for long-term (current) use of other medications Z79.899  
 HTN (hypertension), benign I10 Current Outpatient Medications Medication Sig Dispense Refill  amLODIPine (NORVASC) 5 mg tablet TAKE ONE (1) TABLET(S) DAILY 90 Tab 1  
 losartan-hydroCHLOROthiazide (HYZAAR) 100-25 mg per tablet TAKE ONE TABLET BY MOUTH ONCE DAILY 90 Tab 1  
 triamcinolone acetonide (KENALOG) 0.1 % ointment Apply  to affected area two (2) times a day. use thin layer 80 g 2  
 BISMUTH SUBSALICYLATE (PEPTO-BISMOL PO) Take  by mouth as needed.  multivitamin (ONE A DAY) tablet Take 1 Tab by mouth daily.  ranitidine (ZANTAC) 150 mg tablet Take 1 Tab by mouth two (2) times a day. Doing a little better has care in the house now a few evenings eating better Less sundowning Confused on and off 
 
 
 
Cardiovascular ROS: no chest pain or dyspnea on exertion Neurological ROS: no TIA or stroke symptoms General ROS: negative for - chills, fatigue, fever, malaise, night sweats or sleep disturbance Endocrine ROS: negative for - hair pattern changes, hot flashes, malaise/lethargy, palpitations, polydipsia/polyuria, temperature intolerance or unexpected weight changes Confused at times less Vitals:  
 02/13/19 1403 BP: 135/50 Pulse: 73 Resp: 18 Temp: 98.3 °F (36.8 °C) SpO2: 96% Weight: 87 lb (39.5 kg) Height: 4' 6\" (1.372 m) S1 and S2 normal, no murmurs, clicks, gallops or rubs. Regular rate and rhythm. Chest is clear; no wheezes or rales. No edema or JVD. Ambulates well Diagnoses and all orders for this visit: 1. HTN (hypertension), benign -     CBC WITH AUTOMATED DIFF 
-     METABOLIC PANEL, COMPREHENSIVE 2. Cognitive decline Better with more support still high risk

## 2019-02-13 NOTE — PROGRESS NOTES
1. Have you been to the ER, urgent care clinic since your last visit? Hospitalized since your last visit? No 
 
2. Have you seen or consulted any other health care providers outside of the [address] [place] [address] since your last visit? Include any pap smears or colon screening. No  
Chief Complaint Patient presents with  Hypertension 6 months follow up Not fasting

## 2019-02-14 LAB
ALBUMIN SERPL-MCNC: 4.3 G/DL (ref 3.2–4.6)
ALBUMIN/GLOB SERPL: 1.7 {RATIO} (ref 1.2–2.2)
ALP SERPL-CCNC: 65 IU/L (ref 39–117)
ALT SERPL-CCNC: 9 IU/L (ref 0–32)
AST SERPL-CCNC: 15 IU/L (ref 0–40)
BASOPHILS # BLD AUTO: 0 X10E3/UL (ref 0–0.2)
BASOPHILS NFR BLD AUTO: 0 %
BILIRUB SERPL-MCNC: <0.2 MG/DL (ref 0–1.2)
BUN SERPL-MCNC: 36 MG/DL (ref 10–36)
BUN/CREAT SERPL: 28 (ref 12–28)
CALCIUM SERPL-MCNC: 9.8 MG/DL (ref 8.7–10.3)
CHLORIDE SERPL-SCNC: 101 MMOL/L (ref 96–106)
CO2 SERPL-SCNC: 23 MMOL/L (ref 20–29)
CREAT SERPL-MCNC: 1.3 MG/DL (ref 0.57–1)
EOSINOPHIL # BLD AUTO: 0 X10E3/UL (ref 0–0.4)
EOSINOPHIL NFR BLD AUTO: 0 %
ERYTHROCYTE [DISTWIDTH] IN BLOOD BY AUTOMATED COUNT: 14.5 % (ref 12.3–15.4)
GLOBULIN SER CALC-MCNC: 2.5 G/DL (ref 1.5–4.5)
GLUCOSE SERPL-MCNC: 95 MG/DL (ref 65–99)
HCT VFR BLD AUTO: 35.4 % (ref 34–46.6)
HGB BLD-MCNC: 11.7 G/DL (ref 11.1–15.9)
IMM GRANULOCYTES # BLD AUTO: 0 X10E3/UL (ref 0–0.1)
IMM GRANULOCYTES NFR BLD AUTO: 0 %
INTERPRETATION: NORMAL
LYMPHOCYTES # BLD AUTO: 0.4 X10E3/UL (ref 0.7–3.1)
LYMPHOCYTES NFR BLD AUTO: 5 %
MCH RBC QN AUTO: 29.3 PG (ref 26.6–33)
MCHC RBC AUTO-ENTMCNC: 33.1 G/DL (ref 31.5–35.7)
MCV RBC AUTO: 89 FL (ref 79–97)
MONOCYTES # BLD AUTO: 0.7 X10E3/UL (ref 0.1–0.9)
MONOCYTES NFR BLD AUTO: 8 %
NEUTROPHILS # BLD AUTO: 7.4 X10E3/UL (ref 1.4–7)
NEUTROPHILS NFR BLD AUTO: 87 %
PLATELET # BLD AUTO: 305 X10E3/UL (ref 150–379)
POTASSIUM SERPL-SCNC: 5.1 MMOL/L (ref 3.5–5.2)
PROT SERPL-MCNC: 6.8 G/DL (ref 6–8.5)
RBC # BLD AUTO: 3.99 X10E6/UL (ref 3.77–5.28)
SODIUM SERPL-SCNC: 139 MMOL/L (ref 134–144)
WBC # BLD AUTO: 8.5 X10E3/UL (ref 3.4–10.8)

## 2019-05-08 NOTE — PROGRESS NOTES
Today is a family meeting, Дмитрий Coventry, both her son and daughter were here to visit. They tell me that their mom's status has declined a lot, mostly mental status. There have been safety issues in her apartment with cooking coffee and having paper towels on the burners. She is not always getting herself nutritionally well. She is taking her meds. Her short term memory is very bad. She makes a lot of mistakes. She has many safety issues. There has been an element of paranoia. She is paranoid that people are after her or people are coming in to her apartment. She is noting that denies needing more help. The family are planning on moving her to assisted living, have limited the number down to a couple and have done a lot of work. They know that their mom is going to have anxiety over this. They want me at the next visit to do a MMSE. I told them that is appropriate. They want me to write a letter based on my last visit with her that she is not financially able or not cognitively able to manage her own affairs. I told them that was fine. Lastly, they want me to consider putting her on something for anxiety when I see her next time. I reviewed all this with the family and we will do these things at the next visit. Diagnoses and all orders for this visit:    1. Paranoia (psychosis) ([facility] [address]    2.  Cognitive decline

## 2019-06-25 NOTE — PROGRESS NOTES
1. Have you been to the ER, urgent care clinic since your last visit? Hospitalized since your last visit? No 
 
2. Have you seen or consulted any other health care providers outside of the [address] [place] [address] since your last visit? Include any pap smears or colon screening. ophthamology-dr Ellen De La Garza

## 2019-06-25 NOTE — PROGRESS NOTES
Chief Complaint Patient presents with  Hypertension  Osteoporosis  Cholesterol Problem Peyton Delgadillo is a 80 y.o. female with the following Problems and Medications. Patient Active Problem List  
Diagnosis Code  Dyslipidemia, goal LDL below 100 E78.5  Cognitive decline R41.89  
 Eczema L30.9  Senile osteoporosis M81.0  
 Encounter for long-term (current) use of other medications Z79.899  
 HTN (hypertension), benign I10 Current Outpatient Medications Medication Sig Dispense Refill  losartan-hydroCHLOROthiazide (HYZAAR) 100-25 mg per tablet TAKE ONE TABLET BY MOUTH ONCE DAILY 90 Tab 1  
 amLODIPine (NORVASC) 5 mg tablet TAKE ONE (1) TABLET(S) DAILY 90 Tab 1  
 triamcinolone acetonide (KENALOG) 0.1 % ointment Apply  to affected area two (2) times a day. use thin layer 80 g 2  
 ranitidine (ZANTAC) 150 mg tablet Take 1 Tab by mouth two (2) times a day.  BISMUTH SUBSALICYLATE (PEPTO-BISMOL PO) Take  by mouth as needed.  multivitamin (ONE A DAY) tablet Take 1 Tab by mouth daily. Doing a little better has care in the house now a few evenings eating better Less sundowning Confused on and off  family worried about her mental status Only 4 hours per week of in home help Cardiovascular ROS: no chest pain or dyspnea on exertion Neurological ROS: no TIA or stroke symptoms General ROS: negative for - chills, fatigue, fever, malaise, night sweats or sleep disturbance Endocrine ROS: negative for - hair pattern changes, hot flashes, malaise/lethargy, palpitations, polydipsia/polyuria, temperature intolerance or unexpected weight changes Confused at times less Vitals:  
 06/25/19 1621 BP: 154/72 Pulse: 75 Resp: 18 Temp: 97 °F (36.1 °C) TempSrc: Oral  
SpO2: 100% Weight: 83 lb (37.6 kg) Height: 4' 6\" (1.372 m) S1 and S2 normal, no murmurs, clicks, gallops or rubs.  Regular rate and rhythm. Chest is clear; no wheezes or rales. No edema or JVD. Ambulates well Better with more support still high risk This is the Subsequent Medicare Annual Wellness Exam, performed 12 months or more after the Initial AWV or the last Subsequent AWV I have reviewed the patient's medical history in detail and updated the computerized patient record. History Past Medical History:  
Diagnosis Date  Cognitive decline 6/19/2012  Hypertension No past surgical history on file. Current Outpatient Medications Medication Sig Dispense Refill  losartan-hydroCHLOROthiazide (HYZAAR) 100-25 mg per tablet TAKE ONE TABLET BY MOUTH ONCE DAILY 90 Tab 1  
 amLODIPine (NORVASC) 5 mg tablet TAKE ONE (1) TABLET(S) DAILY 90 Tab 1  
 triamcinolone acetonide (KENALOG) 0.1 % ointment Apply  to affected area two (2) times a day. use thin layer 80 g 2  
 ranitidine (ZANTAC) 150 mg tablet Take 1 Tab by mouth two (2) times a day.  BISMUTH SUBSALICYLATE (PEPTO-BISMOL PO) Take  by mouth as needed.  multivitamin (ONE A DAY) tablet Take 1 Tab by mouth daily. Allergies Allergen Reactions  Fosamax [Alendronate] Myalgia No family history on file. Social History Tobacco Use  Smoking status: Never Smoker  Smokeless tobacco: Never Used Substance Use Topics  Alcohol use: Yes Alcohol/week: 1.0 oz Types: 2 Glasses of wine per week Patient Active Problem List  
Diagnosis Code  Dyslipidemia, goal LDL below 100 E78.5  Cognitive decline R41.89  
 Eczema L30.9  Senile osteoporosis M81.0  
 Encounter for long-term (current) use of other medications Z79.899  
 HTN (hypertension), benign I10 Depression Risk Factor Screening:  
 
3 most recent PHQ Screens 6/25/2019 Little interest or pleasure in doing things Several days Feeling down, depressed, irritable, or hopeless Several days Total Score PHQ 2 2 Alcohol Risk Factor Screening: You do not drink alcohol or very rarely. Functional Ability and Level of Safety:  
Hearing Loss The patient wears hearing aids. Activities of Daily Living The home contains: grab bars Patient needs help with:  transportation, shopping and preparing meals Fall Risk Fall Risk Assessment, last 12 mths 2/13/2019 Able to walk? Yes Fall in past 12 months? No  
Fall with injury? -  
Number of falls in past 12 months - Fall Risk Score -  
 
 
Abuse Screen Patient is not abused Cognitive Screening Evaluation of Cognitive Function: 
Has your family/caregiver stated any concerns about your memory: yes Abnormal, MMSE 
26/30 Patient Care Team  
Patient Care Team: 
Major Bautista MD as PCP - General (Internal Medicine) Assessment/Plan Education and counseling provided: 
Are appropriate based on today's review and evaluation Diagnoses and all orders for this visit: 
 
1. Medicare annual wellness visit, subsequent 2. Screening for depression 
-     [address] Health Maintenance Due Topic Date Due  Shingrix Vaccine Age 50> (1 of 2) 03/15/1971  Pneumococcal 65+ years (2 of 2 - PCV13) 09/18/2014  GLAUCOMA SCREENING Q2Y  03/01/2016  MEDICARE YEARLY EXAM  02/14/2019 She has subclinical depression 1. Medicare annual wellness visit, subsequent 2. Screening for depression Some noted - [address] 3. Cognitive decline Ongoing but MMSE 26 
 
4. Dysthymia Trial SSRI Diagnoses and all orders for this visit: 1. Cognitive decline 2. Medicare annual wellness visit, subsequent 3. Screening for depression 
-     [address] 4. Dysthymia Other orders 
-     sertraline (ZOLOFT) 25 mg tablet; Take 1 Tab by mouth daily.

## 2019-06-25 NOTE — PATIENT INSTRUCTIONS
Medicare Wellness Visit, Female The best way to live healthy is to have a lifestyle where you eat a well-balanced diet, exercise regularly, limit alcohol use, and quit all forms of tobacco/nicotine, if applicable. Regular preventive services are another way to keep healthy. Preventive services (vaccines, screening tests, monitoring & exams) can help personalize your care plan, which helps you manage your own care. Screening tests can find health problems at the earliest stages, when they are easiest to treat. [facility] follows the current, evidence-based guidelines published by the [facility] Meaghan ([facility]) when recommending preventive services for our patients. Because we follow these guidelines, sometimes recommendations change over time as research supports it. (For example, mammograms used to be recommended annually. Even though Medicare will still pay for an annual mammogram, the newer guidelines recommend a mammogram every two years for women of average risk.) Of course, you and your doctor may decide to screen more often for some diseases, based on your risk and your health status. Preventive services for you include: - Medicare offers their members a free annual wellness visit, which is time for you and your primary care provider to discuss and plan for your preventive service needs. Take advantage of this benefit every year! 
-All adults over the age of 72 should receive the recommended pneumonia vaccines. Current USPSTF guidelines recommend a series of two vaccines for the best pneumonia protection.  
-All adults should have a flu vaccine yearly and a tetanus vaccine every 10 years. All adults age 61 and older should receive a shingles vaccine once in their lifetime.   
-A bone mass density test is recommended when a woman turns 65 to screen for osteoporosis. This test is only recommended one time, as a screening. Some providers will use this same test as a disease monitoring tool if you already have osteoporosis. -All adults age 38-68 who are overweight should have a diabetes screening test once every three years.  
-Other screening tests and preventive services for persons with diabetes include: an eye exam to screen for diabetic retinopathy, a kidney function test, a foot exam, and stricter control over your cholesterol.  
-Cardiovascular screening for adults with routine risk involves an electrocardiogram (ECG) at intervals determined by your doctor.  
-Colorectal cancer screenings should be done for adults age 54-65 with no increased risk factors for colorectal cancer. There are a number of acceptable methods of screening for this type of cancer. Each test has its own benefits and drawbacks. Discuss with your doctor what is most appropriate for you during your annual wellness visit. The different tests include: colonoscopy (considered the best screening method), a fecal occult blood test, a fecal DNA test, and sigmoidoscopy. -Breast cancer screenings are recommended every other year for women of normal risk, age 54-69. 
-Cervical cancer screenings for women over age 72 are only recommended with certain risk factors.  
-All adults born between [facility] should be screened once for Hepatitis C. Here is a list of your current Health Maintenance items (your personalized list of preventive services) with a due date: 
Health Maintenance Due Topic Date Due  Shingles Vaccine (1 of 2) 03/15/1971  Pneumococcal Vaccine (2 of 2 - PCV13) 09/18/2014  Glaucoma Screening   03/01/2016 Riky Ghosh Annual Well Visit  02/14/2019

## 2019-09-24 NOTE — PROGRESS NOTES
1. Have you been to the ER, urgent care clinic since your last visit? Hospitalized since your last visit? No    2. Have you seen or consulted any other health care providers outside of the [address] [place] [address] since your last visit? Include any pap smears or colon screening.  ophthamology

## 2019-09-24 NOTE — PROGRESS NOTES
Chief Complaint   Patient presents with    Hypertension    Osteoporosis    Cholesterol Problem     Carlos Darnell is a 80 y.o. female with the following Problems and Medications. Patient Active Problem List   Diagnosis Code    Dyslipidemia, goal LDL below 100 E78.5    Cognitive decline R41.89    Eczema L30.9    Senile osteoporosis M81.0    Encounter for long-term (current) use of other medications Z79.899    HTN (hypertension), benign I10     Current Outpatient Medications   Medication Sig Dispense Refill    losartan-hydroCHLOROthiazide (HYZAAR) 100-25 mg per tablet Take 1 Tab by mouth daily. 90 Tab 1    sertraline (ZOLOFT) 25 mg tablet Take 1 Tab by mouth daily. 90 Tab 0    amLODIPine (NORVASC) 5 mg tablet TAKE ONE (1) TABLET(S) DAILY 90 Tab 1    triamcinolone acetonide (KENALOG) 0.1 % ointment Apply  to affected area two (2) times a day. use thin layer 80 g 2    ranitidine (ZANTAC) 150 mg tablet Take 1 Tab by mouth two (2) times a day.  BISMUTH SUBSALICYLATE (PEPTO-BISMOL PO) Take  by mouth as needed.  multivitamin (ONE A DAY) tablet Take 1 Tab by mouth daily. She is over taking her  BP meds     Much more paranoid  Less sundowning  Confused on and off  family worried about her mental status  Only 4 hours per week of in home help  Subjective: The family note a lot of changes. She has been over-taking medications. I gave her a rx for 25 mg of Zoloft, she finished them all in 90 days by taking three a day. The patient's son and daughter are deeply worried and concerned. The patient has refused to accept any help. The patient continues to struggle as she misidentifies medications. She has become increasingly forgetful, argumentative, sometimes paranoid. She lost the remote control to her TV. She has lost her hearing aids. They are expensive, $2,500. The patient got a set of new hearing aids and lost both the old and new as of today.   The patient recently walked out through a  at her property and informed the guard that someone was breaking into her house. In fact, there was no one breaking into her house. She was somewhat paranoid about the whole thing. Nutrition has been poor. The family finds that she leaves food. She is also having a hard time keeping up with clothing, has stained clothing and is having a hard time keeping things cleaned and washed. Cardiovascular ROS: no chest pain or dyspnea on exertion  Neurological ROS: no TIA or stroke symptoms  General ROS: negative for - chills, fatigue, fever, malaise, night sweats or sleep disturbance  Endocrine ROS: negative for - hair pattern changes, hot flashes, malaise/lethargy, palpitations, polydipsia/polyuria, temperature intolerance or unexpected weight changes  Confused at times less      Vitals:    09/24/19 1530   BP: 153/72   Pulse: 63   Resp: 16   Temp: 96.6 °F (35.9 °C)   TempSrc: Oral   SpO2: 98%   Weight: 84 lb (38.1 kg)   Height: 4' 6\" (1.372 m)     S1 and S2 normal, no murmurs, clicks, gallops or rubs. Regular rate and rhythm. Chest is clear; no wheezes or rales. No edema or JVD. Ambulates well    1. Screening-pulmonary TB  For facility  - [facility]    2. Cognitive decline  Needs to move to safe facility    3.  Alzheimer's dementia with behavioral disturbance, unspecified timing of dementia onset  Has progressed to this

## 2019-12-03 NOTE — PROGRESS NOTES
Chief Complaint Patient presents with  
 Other  
  form for assisted living facility Abdoul Dwyer is a 80 y.o. female with the following Problems and Medications. Patient Active Problem List  
Diagnosis Code  Dyslipidemia, goal LDL below 100 E78.5  Cognitive decline R41.89  
 Eczema L30.9  Senile osteoporosis M81.0  
 Encounter for long-term (current) use of other medications Z79.899  
 HTN (hypertension), benign I10 Current Outpatient Medications Medication Sig Dispense Refill  losartan (COZAAR) 100 mg tablet Take 1 Tab by mouth daily. 90 Tab 3  
 hydroCHLOROthiazide (HYDRODIURIL) 25 mg tablet Take 1 Tab by mouth daily. 90 Tab 3  
 amLODIPine (NORVASC) 5 mg tablet TAKE ONE (1) TABLET(S) DAILY 90 Tab 1  
 sertraline (ZOLOFT) 25 mg tablet TAKE 1 TABLET BY MOUTH EVERY DAY 90 Tab 0  
 triamcinolone acetonide (KENALOG) 0.1 % ointment Apply  to affected area two (2) times a day. use thin layer 80 g 2  
 ranitidine (ZANTAC) 150 mg tablet Take 1 Tab by mouth two (2) times a day.  BISMUTH SUBSALICYLATE (PEPTO-BISMOL PO) Take  by mouth as needed.  multivitamin (ONE A DAY) tablet Take 1 Tab by mouth daily. She is over taking her  BP meds     Much more paranoid Less sundowning  Saw DR lombardo and he wanted her to move  To [facility] on and off  family worried about her mental status Only 4 hours per week of in home help Subjective: The family note a lot of changes. She has been over-taking medications. I gave her a rx for 25 mg of Zoloft, she finished them all in 90 days by taking three a day. The patient's son and daughter are deeply worried and concerned. The patient has refused to accept any help. The patient continues to struggle as she misidentifies medications. She has become increasingly forgetful, argumentative, sometimes paranoid. Vitals:  
 12/03/19 1410 12/03/19 1541 BP: 123/44 113/50 Pulse: 72 Resp: 18 Temp: 96.7 °F (35.9 °C) TempSrc: Oral   
SpO2: 98% Weight: 73 lb (33.1 kg) Height: 4' 6\" (1.372 m) Wt Readings from Last 3 Encounters:  
12/03/19 73 lb (33.1 kg) 09/24/19 84 lb (38.1 kg) 06/25/19 83 lb (37.6 kg) S1 and S2 normal, no murmurs, clicks, gallops or rubs. Regular rate and rhythm. Chest is clear; no wheezes or rales. No edema or JVD. Ambulates fair STOP ALL BP MEDS NOW 
MOVE GIFTY ASAP 
FORMS COMPLETED 
WORK ON WEIGHT GAIN 1. HTN (hypertension), benign Stop all meds 2. Late onset Alzheimer's disease with behavioral disturbance ([address] Per  neuropsych testing Diagnoses and all orders for this visit: 1. Late [facility] 2. HTN (hypertension), benign 3. Abnormal loss of weight 4. Requires assistance with activities of daily living (ADL) Forms completed for patient at time of visit to expedite their care Monitor BP and weight at facility Prolonged visit with 15 minutes of time out  of more than a  25 minute visit spent counseling patient and family and formulation of care

## 2019-12-03 NOTE — PROGRESS NOTES
1. Have you been to the ER, urgent care clinic since your last visit? Hospitalized since your last visit? No 
 
2. Have you seen or consulted any other health care providers outside of the [address] [place] [address] since your last visit? Include any pap smears or colon screening.  No

## 2019-12-12 NOTE — ED NOTES
Patient expressed desire to go home. Daughter who is claiming to be medical POA wants patient to stay in the hospital. Requested Dr. Zita Palacios to speak to patient and daughter and explained that he would be willing to admit the patient if the patient is agreeable however is not going to force patient to stay if she does not want to stay. It is known that patient has dementia however she is answering questions appropriately at this time. Called and spoke to Nursing Supervisor Jazlyn Travis and she stated that it would be up to Dr. Zita Palacios to make the decision if patient was competent or not.

## 2019-12-12 NOTE — ED NOTES
Introduced self to patient. Daughter at bedside and requesting medication to help calm the patient's agitation down. Patient is wanting to go home to [place] and patient has been living in [place] since 2012. Patient is confused at this time. Daughter reported that she has become harder to handle and care for at home.

## 2019-12-12 NOTE — ED NOTES
Daughter is requesting paperwork to take the patient home at this time since patient is not agreeable to stay in the hospital.

## 2019-12-12 NOTE — ED NOTES
The patient was seen and reevaluated by me. She denies any discomfort at this time. She was signed out by  to me for IV fluids and reassessment after lab results and disposition as clinically relevant. Discussed plan of care with daughter and patient. Will update after results. 10:45 PM 
 
PROGRESS NOTE: 
Pt has been reexamined by Manuel Mendiola MD all available results have been reviewed with pt and any available family. Pt understands sx, dx, and tx in ED. Care plan has been outlined and questions have been answered. The patient is adamant about not wanting to stay in the hospital.  She has expressed this desire on several occasions and wants to go home. Daughter is in disagreement and would like her to be admitted however the patient still has the ability to make decisions regarding her care. Discussed with the patient recent benefit of admission and the need for further monitoring. The patient is adamant about wanting to go home and does not want to stay in the hospital.  She was discharged in stable condition with instruction to follow-up with her PCP and return to the ER for any other concerns. Written by Scotty Riley MD,  12:43 AM 
 
.

## 2019-12-12 NOTE — DISCHARGE INSTRUCTIONS
Patient Education        Oral Rehydration: Care Instructions  Your Care Instructions    Dehydration occurs when your body loses too much water. This can happen if you do not drink enough fluids or lose a lot of fluid due to diarrhea, vomiting, or sweating. Being dehydrated can cause health problems and can even be life-threatening. To replace lost fluids, you need to drink liquid that contains special chemicals called electrolytes. Electrolytes keep your body working well. Plain water does not have electrolytes. You also need to rest to prevent more fluid loss. Replacing water and electrolytes (oral rehydration) completely takes about 36 hours. But you should feel better within a few hours. Follow-up care is a key part of your treatment and safety. Be sure to make and go to all appointments, and call your doctor if you are having problems. It's also a good idea to know your test results and keep a list of the medicines you take. How can you care for yourself at home? · Take frequent sips of a drink such as Gatorade, Powerade, or other rehydration drinks that your doctor suggests. These replace both fluid and important chemicals (electrolytes) you need for balance in your blood. · Drink 2 quarts of cool liquid over 2 to 4 hours. You should have at least 10 glasses of liquid a day to replace lost fluid. If you have kidney, heart, or liver disease and have to limit fluids, talk with your doctor before you increase the amount of fluids you drink. · Make your own drink. Measure everything carefully. The drink may not work well or may even be harmful if the amounts are off. ? 1 quart water  ? ½ teaspoon salt  ? 6 teaspoons sugar  · Do not drink liquid with caffeine, such as coffee and shima. · Do not drink any alcohol. It can make you dehydrated. · Drink plenty of fluids, enough so that your urine is light yellow or clear like water.  If you have kidney, heart, or liver disease and have to limit fluids, talk with your doctor before you increase the amount of fluids you drink. When should you call for help? Call 911 anytime you think you may need emergency care. For example, call if:    · You have signs of severe dehydration, such as:  ? You are confused or unable to stay awake.  ? You passed out (lost consciousness).    Call your doctor now or seek immediate medical care if:    · You still have signs of dehydration. You have sunken eyes and a dry mouth, and you pass only a little dark urine.     · You are dizzy or lightheaded, or you feel like you may faint.     · You are not able to keep down fluids.    Watch closely for changes in your health, and be sure to contact your doctor if:    · You do not get better as expected. Where can you learn more? Go to http://rory-sheree.info/. Enter I040 in the search box to learn more about \"Oral Rehydration: Care Instructions. \"  Current as of: June 26, 2019  Content Version: 12.2  © 0990-5804 [employer]. Care instructions adapted under license by [employer] (which disclaims liability or warranty for this information). If you have questions about a medical condition or this instruction, always ask your healthcare professional. [address] any warranty or liability for your use of this information. Patient Education        Weakness: Care Instructions  Your Care Instructions    Weakness is a lack of physical or muscle strength. You may feel that you need to make extra effort to move your arms, legs, or other muscles. Generalized weakness means that you feel weak in most areas of your body. Another type of weakness may affect just one muscle or group of muscles. You may feel weak and tired after you have done too much activity, such as taking an extra-long hike. This is not a serious problem. It often goes away on its own.   Feeling weak can also be caused by medical conditions like thyroid problems, depression, or a virus. Sometimes the cause can be serious. Your doctor may want to do more tests to try to find the cause of the weakness. The doctor has checked you carefully, but problems can develop later. If you notice any problems or new symptoms, get medical treatment right away. Follow-up care is a key part of your treatment and safety. Be sure to make and go to all appointments, and call your doctor if you are having problems. It's also a good idea to know your test results and keep a list of the medicines you take. How can you care for yourself at home? · Rest when you feel tired. · Be safe with medicines. If your doctor prescribed medicine, take it exactly as prescribed. Call your doctor if you think you are having a problem with your medicine. You will get more details on the specific medicines your doctor prescribes. · Do not skip meals. Eating a balanced diet may increase your energy level. · Get some physical activity every day, but do not get too tired. When should you call for help? Call your doctor now or seek immediate medical care if:    · You have new or worse weakness.     · You are dizzy or lightheaded, or you feel like you may faint.    Watch closely for changes in your health, and be sure to contact your doctor if:    · You do not get better as expected. Where can you learn more? Go to http://rory-sheree.info/. Enter 283 [id number] [id number] in the search box to learn more about \"Weakness: Care Instructions. \"  Current as of: June 26, 2019  Content Version: 12.2  © 0356-5916 [employer]. Care instructions adapted under license by [employer] (which disclaims liability or warranty for this information). If you have questions about a medical condition or this instruction, always ask your healthcare professional. [address] any warranty or liability for your use of this information.          Patient Education        Shingles: Care Instructions  Your Care Instructions    Shingles (herpes zoster) causes pain and a blistered rash. The rash can appear anywhere on the body but will be on only one side of the body, the left or right. It will be in a band, a strip, or a small area. The pain can be very severe. Shingles can also cause tingling or itching in the area of the rash. The blisters scab over after a few days and heal in 2 to 4 weeks. Medicines can help you feel better and may help prevent more serious problems caused by shingles. Shingles is caused by the same virus that causes chickenpox. When you have chickenpox, the virus gets into your nerve roots and stays there (becomes dormant) long after you get over the chickenpox. If the virus becomes active again, it can cause shingles. Follow-up care is a key part of your treatment and safety. Be sure to make and go to all appointments, and call your doctor if you are having problems. It's also a good idea to know your test results and keep a list of the medicines you take. How can you care for yourself at home? · Be safe with medicines. Take your medicines exactly as prescribed. Call your doctor if you think you are having a problem with your medicine. Antiviral medicine helps you get better faster. · Try not to scratch or pick at the blisters. They will crust over and fall off on their own if you leave them alone. · Put cool, wet cloths on the area to relieve pain and itching. You can also use calamine lotion. Try not to use so much lotion that it cakes and is hard to get off. · Put cornstarch or baking soda on the sores to help dry them out so they heal faster. · Do not use thick ointment, such as petroleum jelly, on the sores. This will keep them from drying and healing. · To help remove loose crusts, soak them in tap water. This can help decrease oozing, and dry and soothe the skin.   · Take an over-the-counter pain medicine, such as acetaminophen (Tylenol), ibuprofen (Advil, Motrin), or naproxen (Aleve). Read and follow all instructions on the label. · Avoid close contact with people until the blisters have healed. It is very important for you to avoid contact with anyone who has never had chickenpox or the chickenpox vaccine. Pregnant women, young babies, and anyone else who has a hard time fighting infection (such as someone with HIV, diabetes, or cancer) is especially at risk. When should you call for help? Call your doctor now or seek immediate medical care if:    · You have a new or higher fever.     · You have a severe headache and a stiff neck.     · You lose the ability to think clearly.     · The rash spreads to your forehead, nose, eyes, or eyelids.     · You have eye pain, or your vision gets worse.     · You have new pain in your face, or you cannot move the muscles in your face.     · Blisters spread to new parts of your body.    Watch closely for changes in your health, and be sure to contact your doctor if:    · The rash has not healed after 2 to 4 weeks.     · You still have pain after the rash has healed. Where can you learn more? Go to http://rory-sheree.info/. Sera Coleman in the search box to learn more about \"Shingles: Care Instructions. \"  Current as of: June 9, 2019  Content Version: 12.2  © 8507-8270 [employer]. Care instructions adapted under license by [employer] (which disclaims liability or warranty for this information). If you have questions about a medical condition or this instruction, always ask your healthcare professional. [place] [address] any warranty or liability for your use of this information.

## 2019-12-12 NOTE — ED PROVIDER NOTES
80 y.o. female with past medical history significant for dementia, hypertension who presents via private vehicle to the ED with chief complaint of rash. Patient had dementia and most of history was provided by daughter. Daughter reports that she first noticed patient having rash on her right abdomen with radiation to her back one week ago, but states that patient was itching her back and abdomen for the past one month. Daughter notes that previously, patient was alert and able to care for herself, but that in the past month, the patient has become increasingly confused and has difficulty caring for herself. Daughter notes that patient previously lived by herself, but patient will now be moved into an assisted living facility. Daughter also espresses that patient has had very little PO intake and that she has recently lost 10 lb in the past 1 week. 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 She denies patient having any fever, cough or vomiting. Per chart, patient was recently seen by Dr. Marc Ruano, PCP, on 12/3/19 for dementia and cognitive decline. Per note, patient was paranoid and had intermittent confusion. Patient was also not taking medications correctly. She was noted to be forgetful, argmentive and paranoid. Full history, physical exam, and ROS unable to be obtained due to:  dementia. Social Hx: no Tobacco use, yes EtOH use( 1.7 standard drinks of alcohol/wk), no Illicit Drug use PCP: Lauro Mcallister MD 
 
Note written by Francisco Grove, as dictated by Genny Hall, DO 9:28 PM 
 
 
The history is provided by a relative. History limited by: dementia. No  was used. Past Medical History:  
Diagnosis Date  Cognitive decline 6/19/2012  Hypertension No past surgical history on file. No family history on file. Social History Socioeconomic History  Marital status:  Spouse name: Not on file  Number of children: Not on file  Years of education: Not on file  Highest education level: Not on file Occupational History  Not on file Social Needs  Financial resource strain: Not on file  Food insecurity:  
  Worry: Not on file Inability: Not on file  Transportation needs:  
  Medical: Not on file Non-medical: Not on file Tobacco Use  Smoking status: Never Smoker  Smokeless tobacco: Never Used Substance and Sexual Activity  Alcohol use: Yes Alcohol/week: 1.7 standard drinks Types: 2 Glasses of wine per week  Drug use: No  
 Sexual activity: Never Partners: Male Lifestyle  Physical activity:  
  Days per week: Not on file Minutes per session: Not on file  Stress: Not on file Relationships  Social connections:  
  Talks on phone: Not on file Gets together: Not on file Attends Muslim service: Not on file Active member of club or organization: Not on file Attends meetings of clubs or organizations: Not on file Relationship status: Not on file  Intimate partner violence:  
  Fear of current or ex partner: Not on file Emotionally abused: Not on file Physically abused: Not on file Forced sexual activity: Not on file Other Topics Concern  Not on file Social History Narrative  Not on file ALLERGIES: Fosamax [alendronate] Review of Systems Unable to perform ROS: Dementia Vitals:  
 12/11/19 2110 BP: 118/75 Pulse: (!) 127 Resp: 18 Temp: 97.7 °F (36.5 °C) SpO2: 97% Weight: 33.6 kg (74 lb) Height: 4' 9\" (1.448 m) Physical Exam 
Constitutional:   
   Comments: Awake, frail, elderly, dehydrated HENT:  
   Head: Normocephalic and atraumatic. Mouth/Throat:  
   Mouth: Mucous membranes are dry. Eyes:  
   Comments: Right pupil normal.  Left eye is prosthetic Neck:  
   Comments: Trachea midline Cardiovascular: Rate and Rhythm: Tachycardia present. Rhythm irregular. Pulses: Normal pulses. Pulmonary:  
   Effort: Pulmonary effort is normal.  
   Breath sounds: Normal breath sounds. Abdominal:  
   General: Abdomen is flat. Palpations: Abdomen is soft. Tenderness: There is no tenderness. Musculoskeletal:     
   General: No swelling. Skin: 
   General: Skin is warm and dry. Psychiatric:  
   Comments: Calm and cooperative MDM Procedures ED EKG interpretation: 
Rhythm: normal sinus rhythm with PAC's and PVC's. Rate (approx.): 95 bpm; Axis: normal; ST/T wave: non-specific T wave changes laterally; Nothing acute Note written by Francisco Grove, as dictated by Genny Hall DO 10:23 PM 
 
 
ivf Signed out to Dr Greg Carvalho with remainder of workup pending. Recent Results (from the past 12 hour(s)) POC LACTIC ACID Collection Time: 12/11/19 10:15 PM  
Result Value Ref Range Lactic Acid (POC) 2.53 (HH) 0.40 - 2.00 mmol/L  
EKG, 12 LEAD, INITIAL Collection Time: 12/11/19 10:23 PM  
Result Value Ref Range Ventricular Rate 95 BPM  
 Atrial Rate 95 BPM  
 P-R Interval 152 ms QRS Duration 70 ms Q-T Interval 340 ms QTC Calculation (Bezet) 427 ms Calculated T Axis 179 degrees Diagnosis Sinus rhythm with premature atrial complexes and premature ventricular  
complexes or fusion complexes Possible Anterior infarct , age undetermined T wave abnormality, consider lateral ischemia Abnormal ECG No previous ECGs available

## 2019-12-13 NOTE — PROGRESS NOTES
Patient is here for a follow up to the shingles. Patient states no pain today. Patient went [facility].12/11.

## 2019-12-13 NOTE — PROGRESS NOTES
Chief Complaint   Patient presents with    Shingles     right side     she is a 80y.o. year old female who presents for evaluation. Patient presents the office today for follow-up visit of shingles. She is here with her daughter. She reports that her mom was having back pain for period of time. The family thought that the back pain was possibly related to a muscle strain. She has started to use a heating pad and was taking ibuprofen as well. Her daughter states that her mother was also complaining of some stomach pain. They thought initially that the stomach pain was related to the ibuprofen that she was taking to treat the back pain. Her mother then broke out in a rash and they took her to the ER for evaluation. It was at that time that they told them that she had shingles. She was placed on Zovirax and told to follow-up with her PCP. Her daughter decided to keep the appointment today so that her rash could be evaluated and to have some questions further answered. Reviewed PmHx, RxHx, FmHx, SocHx, AllgHx and updated and dated in the chart. Review of Systems - negative except as listed above    Objective:     Vitals:    12/13/19 1302   BP: 112/61   Pulse: 76   Resp: 20   TempSrc: Oral   SpO2: 99%   Weight: 75 lb (34 kg)   Height: 4' 9\" (1.448 m)     Physical Examination: General appearance - alert, well appearing, and in no distress  Skin -and extensive zoster rash noted along the right side starting at the abdomen and going all the way around to the back. The rash of the back and the sides are still weeping. The rash does not appear to be infected. The rash of the abdomen appears to be drying. Assessment/ Plan:   Diagnoses and all orders for this visit:    1.  Herpes zoster without complication    I went over with the patient and her daughter exactly what zoster is in the precautions that should be used around others especially if they have never had the chickenpox virus in the past. Today while the patient was here I applied a 9 adherent dressing to those areas that was still weeping. I advised the daughter that she may try some calamine lotion to help with the drying process. I also went over with her signs and symptoms of infection. The patient is scheduled to see Dr. Roshan Hua next week. I advised her to keep this appointment just in case the patient develops complication or pain. Currently the patient is not complaining of pain. The daughter was advised to please contact the office if any concerns. Total encounter time was 25 minutes; over 50% of the time was spent counseling and coordinating care regarding zoster, signs and symptoms of infection and precautions to use. I have discussed the diagnosis with the patient and the intended plan as seen in the above orders. The patient has received an after-visit summary and questions were answered concerning future plans.      Medication Side Effects and Warnings were discussed with patient: yes  Patient Labs were reviewed and or requested: yes  Patient Past Records were reviewed and or requested  yes    Jessie Cook PA-C